# Patient Record
Sex: MALE | Race: WHITE | NOT HISPANIC OR LATINO | Employment: OTHER | URBAN - METROPOLITAN AREA
[De-identification: names, ages, dates, MRNs, and addresses within clinical notes are randomized per-mention and may not be internally consistent; named-entity substitution may affect disease eponyms.]

---

## 2023-08-21 ENCOUNTER — HOSPITAL ENCOUNTER (INPATIENT)
Facility: CLINIC | Age: 73
LOS: 5 days | Discharge: HOME | DRG: 193 | End: 2023-08-26
Attending: EMERGENCY MEDICINE | Admitting: STUDENT IN AN ORGANIZED HEALTH CARE EDUCATION/TRAINING PROGRAM
Payer: MEDICARE

## 2023-08-21 ENCOUNTER — APPOINTMENT (OUTPATIENT)
Dept: GENERAL RADIOLOGY | Facility: CLINIC | Age: 73
DRG: 193 | End: 2023-08-21
Attending: EMERGENCY MEDICINE
Payer: MEDICARE

## 2023-08-21 DIAGNOSIS — J11.00 INFLUENZAL PNEUMONIA: ICD-10-CM

## 2023-08-21 DIAGNOSIS — R09.02 HYPOXIA: ICD-10-CM

## 2023-08-21 LAB
ALBUMIN SERPL BCG-MCNC: 4.3 G/DL (ref 3.5–5.2)
ALP SERPL-CCNC: 46 U/L (ref 40–129)
ALT SERPL W P-5'-P-CCNC: 22 U/L (ref 0–70)
ANION GAP SERPL CALCULATED.3IONS-SCNC: 14 MMOL/L (ref 7–15)
AST SERPL W P-5'-P-CCNC: 41 U/L (ref 0–45)
BASOPHILS # BLD AUTO: 0 10E3/UL (ref 0–0.2)
BASOPHILS NFR BLD AUTO: 0 %
BILIRUB SERPL-MCNC: 0.5 MG/DL
BUN SERPL-MCNC: 14.9 MG/DL (ref 8–23)
CALCIUM SERPL-MCNC: 8.7 MG/DL (ref 8.8–10.2)
CHLORIDE SERPL-SCNC: 99 MMOL/L (ref 98–107)
CREAT SERPL-MCNC: 1.05 MG/DL (ref 0.67–1.17)
CRP SERPL-MCNC: 37.36 MG/L
DEPRECATED HCO3 PLAS-SCNC: 22 MMOL/L (ref 22–29)
EOSINOPHIL # BLD AUTO: 0 10E3/UL (ref 0–0.7)
EOSINOPHIL NFR BLD AUTO: 0 %
ERYTHROCYTE [DISTWIDTH] IN BLOOD BY AUTOMATED COUNT: 14.6 % (ref 10–15)
FLUAV RNA SPEC QL NAA+PROBE: POSITIVE
FLUBV RNA RESP QL NAA+PROBE: NEGATIVE
GFR SERPL CREATININE-BSD FRML MDRD: 74 ML/MIN/1.73M2
GLUCOSE SERPL-MCNC: 118 MG/DL (ref 70–99)
HCO3 BLDV-SCNC: 23 MMOL/L (ref 21–28)
HCT VFR BLD AUTO: 43.1 % (ref 40–53)
HGB BLD-MCNC: 14.3 G/DL (ref 13.3–17.7)
HOLD SPECIMEN: NORMAL
IMM GRANULOCYTES # BLD: 0.1 10E3/UL
IMM GRANULOCYTES NFR BLD: 1 %
LACTATE BLD-SCNC: 1.4 MMOL/L
LYMPHOCYTES # BLD AUTO: 0.9 10E3/UL (ref 0.8–5.3)
LYMPHOCYTES NFR BLD AUTO: 10 %
MCH RBC QN AUTO: 30.7 PG (ref 26.5–33)
MCHC RBC AUTO-ENTMCNC: 33.2 G/DL (ref 31.5–36.5)
MCV RBC AUTO: 93 FL (ref 78–100)
MONOCYTES # BLD AUTO: 0.6 10E3/UL (ref 0–1.3)
MONOCYTES NFR BLD AUTO: 7 %
NEUTROPHILS # BLD AUTO: 7.3 10E3/UL (ref 1.6–8.3)
NEUTROPHILS NFR BLD AUTO: 82 %
NRBC # BLD AUTO: 0 10E3/UL
NRBC BLD AUTO-RTO: 0 /100
PCO2 BLDV: 38 MM HG (ref 40–50)
PH BLDV: 7.39 [PH] (ref 7.32–7.43)
PLATELET # BLD AUTO: 141 10E3/UL (ref 150–450)
PO2 BLDV: 35 MM HG (ref 25–47)
POTASSIUM SERPL-SCNC: 4 MMOL/L (ref 3.4–5.3)
PROCALCITONIN SERPL IA-MCNC: 0.15 NG/ML
PROT SERPL-MCNC: 6.7 G/DL (ref 6.4–8.3)
RBC # BLD AUTO: 4.66 10E6/UL (ref 4.4–5.9)
RSV RNA SPEC NAA+PROBE: NEGATIVE
SAO2 % BLDV: 66 % (ref 94–100)
SARS-COV-2 RNA RESP QL NAA+PROBE: NEGATIVE
SODIUM SERPL-SCNC: 135 MMOL/L (ref 136–145)
WBC # BLD AUTO: 9 10E3/UL (ref 4–11)

## 2023-08-21 PROCEDURE — 258N000003 HC RX IP 258 OP 636: Performed by: EMERGENCY MEDICINE

## 2023-08-21 PROCEDURE — 80053 COMPREHEN METABOLIC PANEL: CPT | Performed by: EMERGENCY MEDICINE

## 2023-08-21 PROCEDURE — 250N000009 HC RX 250: Performed by: EMERGENCY MEDICINE

## 2023-08-21 PROCEDURE — 71046 X-RAY EXAM CHEST 2 VIEWS: CPT

## 2023-08-21 PROCEDURE — 87077 CULTURE AEROBIC IDENTIFY: CPT | Performed by: EMERGENCY MEDICINE

## 2023-08-21 PROCEDURE — 84145 PROCALCITONIN (PCT): CPT | Performed by: EMERGENCY MEDICINE

## 2023-08-21 PROCEDURE — 85025 COMPLETE CBC W/AUTO DIFF WBC: CPT | Performed by: EMERGENCY MEDICINE

## 2023-08-21 PROCEDURE — 87149 DNA/RNA DIRECT PROBE: CPT | Performed by: EMERGENCY MEDICINE

## 2023-08-21 PROCEDURE — 87077 CULTURE AEROBIC IDENTIFY: CPT | Performed by: STUDENT IN AN ORGANIZED HEALTH CARE EDUCATION/TRAINING PROGRAM

## 2023-08-21 PROCEDURE — 87637 SARSCOV2&INF A&B&RSV AMP PRB: CPT | Performed by: EMERGENCY MEDICINE

## 2023-08-21 PROCEDURE — 36415 COLL VENOUS BLD VENIPUNCTURE: CPT | Performed by: STUDENT IN AN ORGANIZED HEALTH CARE EDUCATION/TRAINING PROGRAM

## 2023-08-21 PROCEDURE — 250N000013 HC RX MED GY IP 250 OP 250 PS 637: Performed by: STUDENT IN AN ORGANIZED HEALTH CARE EDUCATION/TRAINING PROGRAM

## 2023-08-21 PROCEDURE — 83605 ASSAY OF LACTIC ACID: CPT

## 2023-08-21 PROCEDURE — 99285 EMERGENCY DEPT VISIT HI MDM: CPT | Mod: 25

## 2023-08-21 PROCEDURE — 120N000001 HC R&B MED SURG/OB

## 2023-08-21 PROCEDURE — 36415 COLL VENOUS BLD VENIPUNCTURE: CPT | Performed by: EMERGENCY MEDICINE

## 2023-08-21 PROCEDURE — 5A09357 ASSISTANCE WITH RESPIRATORY VENTILATION, LESS THAN 24 CONSECUTIVE HOURS, CONTINUOUS POSITIVE AIRWAY PRESSURE: ICD-10-PCS | Performed by: STUDENT IN AN ORGANIZED HEALTH CARE EDUCATION/TRAINING PROGRAM

## 2023-08-21 PROCEDURE — 99222 1ST HOSP IP/OBS MODERATE 55: CPT | Mod: AI | Performed by: STUDENT IN AN ORGANIZED HEALTH CARE EDUCATION/TRAINING PROGRAM

## 2023-08-21 PROCEDURE — 250N000013 HC RX MED GY IP 250 OP 250 PS 637: Performed by: EMERGENCY MEDICINE

## 2023-08-21 PROCEDURE — 258N000003 HC RX IP 258 OP 636: Performed by: STUDENT IN AN ORGANIZED HEALTH CARE EDUCATION/TRAINING PROGRAM

## 2023-08-21 PROCEDURE — 86140 C-REACTIVE PROTEIN: CPT | Performed by: EMERGENCY MEDICINE

## 2023-08-21 RX ORDER — SIMVASTATIN 20 MG
20 TABLET ORAL AT BEDTIME
COMMUNITY

## 2023-08-21 RX ORDER — ACETAMINOPHEN 650 MG/1
650 SUPPOSITORY RECTAL EVERY 6 HOURS PRN
Status: DISCONTINUED | OUTPATIENT
Start: 2023-08-21 | End: 2023-08-26 | Stop reason: HOSPADM

## 2023-08-21 RX ORDER — METOPROLOL SUCCINATE 25 MG/1
25 TABLET, EXTENDED RELEASE ORAL EVERY EVENING
Status: DISCONTINUED | OUTPATIENT
Start: 2023-08-21 | End: 2023-08-26 | Stop reason: HOSPADM

## 2023-08-21 RX ORDER — PANTOPRAZOLE SODIUM 20 MG/1
20 TABLET, DELAYED RELEASE ORAL
Status: DISCONTINUED | OUTPATIENT
Start: 2023-08-22 | End: 2023-08-26 | Stop reason: HOSPADM

## 2023-08-21 RX ORDER — BUPROPION HYDROCHLORIDE 150 MG/1
150 TABLET ORAL EVERY MORNING
Status: DISCONTINUED | OUTPATIENT
Start: 2023-08-22 | End: 2023-08-26 | Stop reason: HOSPADM

## 2023-08-21 RX ORDER — ACETAMINOPHEN 325 MG/1
650 TABLET ORAL EVERY 6 HOURS PRN
Status: DISCONTINUED | OUTPATIENT
Start: 2023-08-21 | End: 2023-08-26 | Stop reason: HOSPADM

## 2023-08-21 RX ORDER — LOPERAMIDE HYDROCHLORIDE 2 MG/1
2 TABLET ORAL 2 TIMES DAILY PRN
COMMUNITY

## 2023-08-21 RX ORDER — GUAIFENESIN 200 MG/10ML
200 LIQUID ORAL EVERY 4 HOURS PRN
Status: DISCONTINUED | OUTPATIENT
Start: 2023-08-21 | End: 2023-08-26 | Stop reason: HOSPADM

## 2023-08-21 RX ORDER — ASPIRIN 81 MG/1
81 TABLET ORAL DAILY
Status: DISCONTINUED | OUTPATIENT
Start: 2023-08-22 | End: 2023-08-26 | Stop reason: HOSPADM

## 2023-08-21 RX ORDER — OSELTAMIVIR PHOSPHATE 75 MG/1
75 CAPSULE ORAL 2 TIMES DAILY
Status: COMPLETED | OUTPATIENT
Start: 2023-08-22 | End: 2023-08-26

## 2023-08-21 RX ORDER — LEVOTHYROXINE SODIUM 175 UG/1
175 TABLET ORAL DAILY
COMMUNITY

## 2023-08-21 RX ORDER — SODIUM CHLORIDE 9 MG/ML
INJECTION, SOLUTION INTRAVENOUS CONTINUOUS
Status: ACTIVE | OUTPATIENT
Start: 2023-08-21 | End: 2023-08-22

## 2023-08-21 RX ORDER — SIMVASTATIN 20 MG
20 TABLET ORAL AT BEDTIME
Status: DISCONTINUED | OUTPATIENT
Start: 2023-08-21 | End: 2023-08-26 | Stop reason: HOSPADM

## 2023-08-21 RX ORDER — ONDANSETRON 4 MG/1
4 TABLET, ORALLY DISINTEGRATING ORAL EVERY 6 HOURS PRN
Status: DISCONTINUED | OUTPATIENT
Start: 2023-08-21 | End: 2023-08-26 | Stop reason: HOSPADM

## 2023-08-21 RX ORDER — OSELTAMIVIR PHOSPHATE 75 MG/1
75 CAPSULE ORAL ONCE
Status: COMPLETED | OUTPATIENT
Start: 2023-08-21 | End: 2023-08-21

## 2023-08-21 RX ORDER — BUPROPION HYDROCHLORIDE 150 MG/1
150 TABLET ORAL EVERY MORNING
COMMUNITY

## 2023-08-21 RX ORDER — FLUOXETINE 40 MG/1
40 CAPSULE ORAL
COMMUNITY

## 2023-08-21 RX ORDER — ACETAMINOPHEN 500 MG
1000 TABLET ORAL ONCE
Status: COMPLETED | OUTPATIENT
Start: 2023-08-21 | End: 2023-08-21

## 2023-08-21 RX ORDER — ASPIRIN 81 MG/1
81 TABLET ORAL
COMMUNITY

## 2023-08-21 RX ORDER — ONDANSETRON 2 MG/ML
4 INJECTION INTRAMUSCULAR; INTRAVENOUS EVERY 6 HOURS PRN
Status: DISCONTINUED | OUTPATIENT
Start: 2023-08-21 | End: 2023-08-26 | Stop reason: HOSPADM

## 2023-08-21 RX ORDER — METOPROLOL SUCCINATE 25 MG/1
25 TABLET, EXTENDED RELEASE ORAL EVERY EVENING
COMMUNITY

## 2023-08-21 RX ORDER — LANOLIN ALCOHOL/MO/W.PET/CERES
1000 CREAM (GRAM) TOPICAL DAILY
COMMUNITY

## 2023-08-21 RX ORDER — IBUPROFEN 400 MG/1
400 TABLET, FILM COATED ORAL ONCE
Status: COMPLETED | OUTPATIENT
Start: 2023-08-21 | End: 2023-08-21

## 2023-08-21 RX ORDER — LEVOTHYROXINE SODIUM 175 UG/1
175 TABLET ORAL
Status: DISCONTINUED | OUTPATIENT
Start: 2023-08-22 | End: 2023-08-26 | Stop reason: HOSPADM

## 2023-08-21 RX ORDER — IPRATROPIUM BROMIDE AND ALBUTEROL SULFATE 2.5; .5 MG/3ML; MG/3ML
3 SOLUTION RESPIRATORY (INHALATION) ONCE
Status: COMPLETED | OUTPATIENT
Start: 2023-08-21 | End: 2023-08-21

## 2023-08-21 RX ORDER — LIDOCAINE 40 MG/G
CREAM TOPICAL
Status: DISCONTINUED | OUTPATIENT
Start: 2023-08-21 | End: 2023-08-26 | Stop reason: HOSPADM

## 2023-08-21 RX ADMIN — IPRATROPIUM BROMIDE AND ALBUTEROL SULFATE 3 ML: .5; 3 SOLUTION RESPIRATORY (INHALATION) at 18:33

## 2023-08-21 RX ADMIN — SODIUM CHLORIDE 1000 ML: 9 INJECTION, SOLUTION INTRAVENOUS at 16:52

## 2023-08-21 RX ADMIN — ACETAMINOPHEN 1000 MG: 500 TABLET, FILM COATED ORAL at 16:50

## 2023-08-21 RX ADMIN — IBUPROFEN 400 MG: 400 TABLET ORAL at 16:50

## 2023-08-21 RX ADMIN — OSELTAMIVIR PHOSPHATE 75 MG: 75 CAPSULE ORAL at 20:24

## 2023-08-21 RX ADMIN — SIMVASTATIN 20 MG: 20 TABLET, FILM COATED ORAL at 22:43

## 2023-08-21 RX ADMIN — SODIUM CHLORIDE: 9 INJECTION, SOLUTION INTRAVENOUS at 21:55

## 2023-08-21 RX ADMIN — METOPROLOL SUCCINATE 25 MG: 25 TABLET, EXTENDED RELEASE ORAL at 21:56

## 2023-08-21 RX ADMIN — APIXABAN 5 MG: 5 TABLET, FILM COATED ORAL at 21:56

## 2023-08-21 ASSESSMENT — ACTIVITIES OF DAILY LIVING (ADL)
ADLS_ACUITY_SCORE: 35
DIFFICULTY_EATING/SWALLOWING: NO
WALKING_OR_CLIMBING_STAIRS_DIFFICULTY: NO
CONCENTRATING,_REMEMBERING_OR_MAKING_DECISIONS_DIFFICULTY: NO
ADLS_ACUITY_SCORE: 35
WEAR_GLASSES_OR_BLIND: YES
ADLS_ACUITY_SCORE: 35
CHANGE_IN_FUNCTIONAL_STATUS_SINCE_ONSET_OF_CURRENT_ILLNESS/INJURY: YES
FALL_HISTORY_WITHIN_LAST_SIX_MONTHS: NO
DOING_ERRANDS_INDEPENDENTLY_DIFFICULTY: NO
TOILETING_ISSUES: NO
DRESSING/BATHING_DIFFICULTY: NO
ADLS_ACUITY_SCORE: 35

## 2023-08-21 NOTE — ED PROVIDER NOTES
"  History     Chief Complaint:  Altered Mental Status       HPI   Los Guerrero is a 73 year old male who presents with a 2-day history of acute onset of fever, and significant cough.  Things really intensified yesterday.  He just traveled to [place] and so had plenty of exposure opportunity both at the park and on the airplane in both directions.  The patient notes that his COVID vaccinations are up-to-date.  He has not yet received the seasonal influenza vaccine as it is not widely available in the market.  Patient has no history of COPD, emphysema, or asthma.  Patient has had some mild body aches.      Independent Historian:   Patient's wife provides some supplemental history.  She notes that she has not specifically been ill.    Review of External Notes:  None    Allergies:  Penicillins     Medications:    apixaban ANTICOAGULANT (ELIQUIS) 5 MG tablet  aspirin 81 MG EC tablet  buPROPion (WELLBUTRIN XL) 150 MG 24 hr tablet  cyanocobalamin (VITAMIN B-12) 1000 MCG tablet  FLUoxetine (PROZAC) 40 MG capsule  levothyroxine (SYNTHROID/LEVOTHROID) 175 MCG tablet  loperamide (IMODIUM A-D) 2 MG tablet  metoprolol succinate ER (TOPROL XL) 25 MG 24 hr tablet  Omeprazole Magnesium (PRILOSEC PO)  simvastatin (ZOCOR) 20 MG tablet  VITAMIN D PO        Past Medical History:    Paroxysmal atrial fibrillation  Hypothyroidism  Possible TIA    Past Surgical History:       Patient has had shoulder replacement and bilateral knee replacements.  Family History:    family history is not on file.    Social History:     PCP: No Ref-Primary, Physician     Physical Exam   Patient Vitals for the past 24 hrs:   BP Temp Temp src Pulse Resp SpO2 Height Weight   08/21/23 1800 -- -- -- 92 19 91 % -- --   08/21/23 1526 (!) 140/88 (!) 102.8  F (39.3  C) Oral 96 16 90 % 1.676 m (5' 6\") 83.5 kg (184 lb)   08/21/23 1525 (!) 140/88 (!) 102.8  F (39.3  C) Oral 96 18 91 % 1.676 m (5' 6\") 81.6 kg (180 lb)   Temperature was rechecked at 1824 hrs. and " was afebrile at 98        General: Resting uncomfortably on the gurney, the patient has a very frequent paroxysmal cough which is largely dry.  Head:  The scalp, face, and head appear normal  Eyes:  The pupils are equal, round, and reactive to light    There is no nystagmus    Extraocular muscles are intact    Conjunctivae and sclerae are normal  ENT:    The nose is normal    Pinnae are normal    The oropharynx is normal  Neck:  Normal range of motion    There is no rigidity noted  CV:  Regular rate  Normal underlying rhythm     Normal S1/S2    No pathological murmur detected  Resp:  Lungs are clear    There is no tachypnea    Non-labored    No rales    No wheezing   GI:  Abdomen is soft, there is no rigidity    No distension/tympani    No rebound tenderness     Non-surgical without peritoneal features at this time  MS:  Normal muscular tone    Symmetric motor strength    No major joint effusions    No asymmetric leg swelling, no calf tenderness  Skin:  No rash or acute skin lesions noted  Neuro:  Speech is normal and fluent, there is no aphasia    No motor deficits    Cranial nerves are intact  Psych: Awake. Alert.      Normal affect.  Appropriate interactions.      Emergency Department Course   No results found for this or any previous visit.     Imaging:  XR Chest 2 Views   Final Result   IMPRESSION: Left-sided portacatheter with tip in the SVC. Normal heart size. Coronary arterial stents. No pulmonary infiltrates. Mild hyperinflation.         Report per radiology    Laboratory:  Labs Ordered and Resulted from Time of ED Arrival to Time of ED Departure   INFLUENZA A/B, RSV, & SARS-COV2 PCR - Abnormal       Result Value    Influenza A PCR Positive (*)     Influenza B PCR Negative      RSV PCR Negative      SARS CoV2 PCR Negative     COMPREHENSIVE METABOLIC PANEL - Abnormal    Sodium 135 (*)     Potassium 4.0      Chloride 99      Carbon Dioxide (CO2) 22      Anion Gap 14      Urea Nitrogen 14.9      Creatinine  1.05      Calcium 8.7 (*)     Glucose 118 (*)     Alkaline Phosphatase 46      AST 41      ALT 22      Protein Total 6.7      Albumin 4.3      Bilirubin Total 0.5      GFR Estimate 74     ISTAT GASES LACTATE VENOUS POCT - Abnormal    Lactic Acid POCT 1.4      Bicarbonate Venous POCT 23      O2 Sat, Venous POCT 66 (*)     pCO2 Venous POCT 38 (*)     pH Venous POCT 7.39      pO2 Venous POCT 35     CBC WITH PLATELETS AND DIFFERENTIAL - Abnormal    WBC Count 9.0      RBC Count 4.66      Hemoglobin 14.3      Hematocrit 43.1      MCV 93      MCH 30.7      MCHC 33.2      RDW 14.6      Platelet Count 141 (*)     % Neutrophils 82      % Lymphocytes 10      % Monocytes 7      % Eosinophils 0      % Basophils 0      % Immature Granulocytes 1      NRBCs per 100 WBC 0      Absolute Neutrophils 7.3      Absolute Lymphocytes 0.9      Absolute Monocytes 0.6      Absolute Eosinophils 0.0      Absolute Basophils 0.0      Absolute Immature Granulocytes 0.1      Absolute NRBCs 0.0     PROCALCITONIN - Abnormal    Procalcitonin 0.15 (*)    CRP INFLAMMATION - Abnormal    CRP Inflammation 37.36 (*)    BLOOD CULTURE   BLOOD CULTURE        Procedures       Emergency Department Course & Assessments:             Interventions:  Medications   oseltamivir (TAMIFLU) capsule 75 mg (has no administration in time range)   ipratropium - albuterol 0.5 mg/2.5 mg/3 mL (DUONEB) neb solution 3 mL (has no administration in time range)   acetaminophen (TYLENOL) tablet 1,000 mg (1,000 mg Oral $Given 8/21/23 1650)   ibuprofen (ADVIL/MOTRIN) tablet 400 mg (400 mg Oral $Given 8/21/23 1650)   0.9% sodium chloride BOLUS (1,000 mLs Intravenous $New Bag 8/21/23 1652)          Independent Interpretation:  Looked at the patient's chest x-ray this does show some increased interstitial markings consistent with a viral type pneumonitis.  This is consistent with influenza pneumonia    Assessments/Consultations/Discussion of Management:     Was seen on arrival  6:25  "PM  Patient was reassessed at this time to go over results and and note the admission for oxygen therapy.    6:31 PM  Patient is admitted to Dr. Ashanti Joshi for further management    Disposition:  Admission to the hospital for further management    Impression & Plan        Medical Decision Making:  This patient presents to the emergency department with an acute febrile illness.  He presents with fever cough and hypoxia.  The patient is on Eliquis at baseline for paroxysmal atrial fibrillation diagnosed earlier this year.  He has no history of DVT or PE.  Patient underwent extensive evaluation in the emergency department and was noted to have acute influenza.  He was COVID-negative and RSV negative.  His white count was normal which is consistent with a viral process.  His chest x-ray shows increased interstitial markings consistent with a viral type pneumonitis.  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  Patient will require admission to the hospital for oxygen therapy.  Tamiflu will be initiated.  I will also try a DuoNeb as the patient had some very subtle wheezing on examination.  There is a broad differential diagnosis to hypoxia which could include bacterial pneumonia, atypical pneumonia, viral pneumonia, pneumothorax, occult pulmonary embolism.  There is no indication that the patient has lobar infiltrate, and given that the patient is on chronic anticoagulation we will not pursue a diagnosis of PE especially in light of the patient's of fever and cough.  The patient will be placed on inpatient status at this time is as it is unlikely the patient will be able to be discharged tomorrow with this presentation.      Diagnosis:     Influenza pneumonia  Hypoxia  History of atrial fibrillation on anticoagulation      Gurvinder Carbajal MD  8/21/2023   Gurvinder Carbajal MD Rock, " Gurvinder MAURER MD  08/21/23 1612

## 2023-08-21 NOTE — H&P
[facility]    History and Physical - Hospitalist Service       Date of Admission:  8/21/2023    Assessment & Plan      Los Guerrero is a 73 year old male admitted on 8/21/2023 for SOB and noted to be in flu positive.    Influenza A  Acute hypoxic respiratory failure  Patient arrived from the airport after several day trip to [place] with his wife. He reports about 3-4 days ago while on his trip he started feeling fatigued with fevers and sweats. He also developed a cough. Within the ED patient febrile to 102.8. hypoxic to 88% on RA. Labs revealed +influenza A with negative covid. Lactate 1.4 with procal of 0.15. CXR without discrete opacities.    - no signs of bacterial pneumonia with mildly elevated procal and normal WBC count  - start tamiflu x5 days  - cough drops, duonebs as needed  - will give IVF x 10 hours as patient reports little PO intake   - obtain 1 more blood culture as only 1 set obtained in the ED, trend cultures    pAfib  CAD s/p PCI  - resume home, metoprolol, eliquis, atorvastatin and asa    Lymphoma in remission  Last treatment 7 years ago. He still has a port in place for preference  - on surveillance    KENYA  -BIPAP per home settings    Incidental CVA  Noted on MRI imaging obtained as outpatient work up for dizziness 05/2023  - outpatient work up with PCP ongoing  - continue eliquis, asa and statin    Depression  Suspected mild cognitive decline  - Resume home wellbutrin. Patient tapering off prozac, will just hold for now  - wife notes ongoing outpatient work up for some memory problems and patient with some forgetfulness noted during interview    Hypothyroidism  - Resume home levothyroxine    KENYA  - Home CPAP             Diet:  regular  DVT Prophylaxis: DOAC  Springer Catheter: Not present  Lines: None     Cardiac Monitoring: None  Code Status:  FULL    Clinically Significant Risk Factors Present on Admission               # Drug Induced Coagulation Defect: home  "medication list includes an anticoagulant medication  # Drug Induced Platelet Defect: home medication list includes an antiplatelet medication        # Overweight: Estimated body mass index is 29.7 kg/m  as calculated from the following:    Height as of this encounter: 1.676 m (5' 6\").    Weight as of this encounter: 83.5 kg (184 lb).              Disposition Plan           Sherry Randhawa DO  Hospitalist Service  [facility]  Securely message with [employer] (more info)  Text page via [email or web address] Paging/Directory     ______________________________________________________________________    Chief Complaint   SOB, fatigue    History is obtained from the patient    History of Present Illness   Los Guerrero is a 73 year old male who arrived from the airport after several day trip to [place] with his wife. He reports about 3-4 days ago while on his trip he started feeling fatigued with fevers and sweats. On my assessment patient is fatigued and falls asleep during the interview. He reports not feeling well for a few days and it has progressively worsened. He has been coughing a lot but not getting anything up. He is not eating or drinking much. He has sweats and chills. He denies chest pain. NO GI complaints. He is a bit forgetful during the interview and wife supplements and tells me this has been ongoing issue at home      Past Medical History    No past medical history on file.    Past Surgical History   No past surgical history on file.    Prior to Admission Medications   Prior to Admission Medications   Prescriptions Last Dose Informant Patient Reported? Taking?   FLUoxetine (PROZAC) 40 MG capsule  Spouse/Significant Other Yes Yes   Sig: Take 40 mg by mouth Approximately every other day, \"titrating off\"   Omeprazole Magnesium (PRILOSEC PO) 8/21/2023 Spouse/Significant Other Yes Yes   Sig: Take by mouth daily Dose unknown, OTC   VITAMIN D PO  Spouse/Significant Other Yes Yes   Sig: Take by " mouth daily Dose unknown, possibly 5000 units   apixaban ANTICOAGULANT (ELIQUIS) 5 MG tablet 8/21/2023 at AM Spouse/Significant Other Yes Yes   Sig: Take 5 mg by mouth 2 times daily   aspirin 81 MG EC tablet  Spouse/Significant Other Yes Yes   Sig: Take 81 mg by mouth Approximately every other day per spouse   buPROPion (WELLBUTRIN XL) 150 MG 24 hr tablet 8/21/2023 Spouse/Significant Other Yes Yes   Sig: Take 150 mg by mouth every morning   cyanocobalamin (VITAMIN B-12) 1000 MCG tablet 8/21/2023 Spouse/Significant Other Yes Yes   Sig: Take 1,000 mcg by mouth daily   levothyroxine (SYNTHROID/LEVOTHROID) 175 MCG tablet 8/21/2023 Spouse/Significant Other Yes Yes   Sig: Take 175 mcg by mouth daily   loperamide (IMODIUM A-D) 2 MG tablet  Spouse/Significant Other Yes Yes   Sig: Take 2 mg by mouth 2 times daily as needed for diarrhea   metoprolol succinate ER (TOPROL XL) 25 MG 24 hr tablet 8/20/2023 Spouse/Significant Other Yes Yes   Sig: Take 25 mg by mouth every evening   simvastatin (ZOCOR) 20 MG tablet 8/20/2023 Spouse/Significant Other Yes Yes   Sig: Take 20 mg by mouth At Bedtime      Facility-Administered Medications: None        Review of Systems    The 10 point Review of Systems is negative other than noted in the HPI or here.      Physical Exam   Vital Signs: Temp: 98.9  F (37.2  C) Temp src: Oral BP: 129/76 Pulse: 74   Resp: 19 SpO2: 95 % O2 Device: Nasal cannula Oxygen Delivery: 3 LPM  Weight: 184 lbs 0 oz    Constitutional: Awake, alert, cooperative,  Eyes: Conjunctiva and pupils examined and normal.  HEENT: dry mucus membranes noted  Respiratory: course breath sounds throughout  Cardiovascular:irregular rate and rhythm, and no murmur noted.  GI: Soft, non-distended, non-tender, normal bowel sounds.  Skin: No rashes, no cyanosis, no edema.  Musculoskeletal: deviation of joints in the toes likely chronic  Psychiatric: Alert, oriented to person, place and time,    Medical Decision Making       60 MINUTES SPENT  BY ME on the date of service doing chart review, history, exam, documentation & further activities per the note.      Data     I have personally reviewed the following data over the past 24 hrs:    9.0  \   14.3   / 141 (L)     135 (L) 99 14.9 /  118 (H)   4.0 22 1.05 \     ALT: 22 AST: 41 AP: 46 TBILI: 0.5   ALB: 4.3 TOT PROTEIN: 6.7 LIPASE: N/A     Procal: 0.15 (H) CRP: 37.36 (H) Lactic Acid: 1.4         Imaging results reviewed over the past 24 hrs:   Recent Results (from the past 24 hour(s))   XR Chest 2 Views    Narrative    EXAM: XR CHEST 2 VIEWS  LOCATION: [facility]  DATE: 8/21/2023    INDICATION: Fever, cough, pneumonia  COMPARISON: None.      Impression    IMPRESSION: Left-sided portacatheter with tip in the SVC. Normal heart size. Coronary arterial stents. No pulmonary infiltrates. Mild hyperinflation.

## 2023-08-21 NOTE — PHARMACY-ADMISSION MEDICATION HISTORY
"Pharmacist Admission Medication History    Admission medication history is complete. The information provided in this note is only as accurate as the sources available at the time of the update.    Medication reconciliation/reorder completed by provider prior to medication history? No    Information Source(s): Family member, Patient's pharmacy, and pillbox  via in-person and phone    Pertinent Information:   -per spouse, patient is \"titrating off\" fluoxetine. Currently taking approximately every other day and thinks the plan is to stop the medication ~next week.   -per spouse, she puts a baby aspirin in pillbox approximately every other day as she was not sure if patient was supposed to still be taking this with his Eliquis.  -Solifenacin 5 mg daily filled 5/22/23 for 90-day supply. Spouse reports this did not work and patient is no longer taking.    Changes made to PTA medication list:  Added: all medications  Deleted: None  Changed: None    Medication Affordability:  Not including over the counter (OTC) medications, was there a time in the past 3 months when you did not take your medications as prescribed because of cost?: No    Allergies reviewed with patient and updates made in EHR: yes, spouse reports an allergy to unknown medication patient received as a \"spinal block\" for a knee surgery many years ago. Spouse reports patient \"flatlined.\" Unable to add as unclear what medication this was.    Medication History Completed By: Miranda Matt RPH 8/21/2023 5:41 PM    Prior to Admission medications    Medication Sig Last Dose Taking? Auth Provider Long Term End Date   apixaban ANTICOAGULANT (ELIQUIS) 5 MG tablet Take 5 mg by mouth 2 times daily 8/21/2023 at AM Yes Unknown, Entered By History     aspirin 81 MG EC tablet Take 81 mg by mouth Approximately every other day per spouse  Yes Unknown, Entered By History     buPROPion (WELLBUTRIN XL) 150 MG 24 hr tablet Take 150 mg by mouth every morning 8/21/2023 Yes " "Unknown, Entered By History Yes    cyanocobalamin (VITAMIN B-12) 1000 MCG tablet Take 1,000 mcg by mouth daily 8/21/2023 Yes Unknown, Entered By History     FLUoxetine (PROZAC) 40 MG capsule Take 40 mg by mouth Approximately every other day, \"titrating off\"  Yes Unknown, Entered By History Yes    levothyroxine (SYNTHROID/LEVOTHROID) 175 MCG tablet Take 175 mcg by mouth daily 8/21/2023 Yes Unknown, Entered By History Yes    loperamide (IMODIUM A-D) 2 MG tablet Take 2 mg by mouth 2 times daily as needed for diarrhea  Yes Unknown, Entered By History     metoprolol succinate ER (TOPROL XL) 25 MG 24 hr tablet Take 25 mg by mouth every evening 8/20/2023 Yes Unknown, Entered By History Yes    Omeprazole Magnesium (PRILOSEC PO) Take by mouth daily Dose unknown, OTC 8/21/2023 Yes Unknown, Entered By History     simvastatin (ZOCOR) 20 MG tablet Take 20 mg by mouth At Bedtime 8/20/2023 Yes Unknown, Entered By History Yes    VITAMIN D PO Take by mouth daily Dose unknown, possibly 5000 units  Yes Unknown, Entered By History         "

## 2023-08-21 NOTE — ED NOTES
[facility]  ED Nurse Handoff Report    ED Chief complaint: Altered Mental Status      ED Diagnosis:   Final diagnoses:   None       Code Status: Full Code    Allergies:   Allergies   Allergen Reactions    Penicillins Shortness Of Breath       Patient Story:   Brought to ED via EMS from airport. Wife noted him to have altered metal status ad a cough ad fever    Focused Assessment:    Neuro: Alert, oriented x 3  Respiratory:Congested cough but non-productive while here. Positiv\e for influenza-A   Cardiology:  mild HTN   Gastrointestinal: soft, non tender, non distended   Genitourinary/Renal:  Was incontinent of stool and urine PTA. Has depends on currently  Musculoskeletal: moves all extremities   Skin: Intact skin   Lines: has a port  in the left upper chest that was accessed in the ED. Dressing new, CDI withbio-patch in place    Labs Ordered and Resulted from Time of ED Arrival to Time of ED Departure   INFLUENZA A/B, RSV, & SARS-COV2 PCR - Abnormal       Result Value    Influenza A PCR Positive (*)     Influenza B PCR Negative      RSV PCR Negative      SARS CoV2 PCR Negative     COMPREHENSIVE METABOLIC PANEL - Abnormal    Sodium 135 (*)     Potassium 4.0      Chloride 99      Carbon Dioxide (CO2) 22      Anion Gap 14      Urea Nitrogen 14.9      Creatinine 1.05      Calcium 8.7 (*)     Glucose 118 (*)     Alkaline Phosphatase 46      AST 41      ALT 22      Protein Total 6.7      Albumin 4.3      Bilirubin Total 0.5      GFR Estimate 74     ISTAT GASES LACTATE VENOUS POCT - Abnormal    Lactic Acid POCT 1.4      Bicarbonate Venous POCT 23      O2 Sat, Venous POCT 66 (*)     pCO2 Venous POCT 38 (*)     pH Venous POCT 7.39      pO2 Venous POCT 35     CBC WITH PLATELETS AND DIFFERENTIAL - Abnormal    WBC Count 9.0      RBC Count 4.66      Hemoglobin 14.3      Hematocrit 43.1      MCV 93      MCH 30.7      MCHC 33.2      RDW 14.6      Platelet Count 141 (*)     % Neutrophils 82      % Lymphocytes 10       % Monocytes 7      % Eosinophils 0      % Basophils 0      % Immature Granulocytes 1      NRBCs per 100 WBC 0      Absolute Neutrophils 7.3      Absolute Lymphocytes 0.9      Absolute Monocytes 0.6      Absolute Eosinophils 0.0      Absolute Basophils 0.0      Absolute Immature Granulocytes 0.1      Absolute NRBCs 0.0     PROCALCITONIN - Abnormal    Procalcitonin 0.15 (*)    CRP INFLAMMATION - Abnormal    CRP Inflammation 37.36 (*)    BLOOD CULTURE   BLOOD CULTURE        XR Chest 2 Views   Final Result   IMPRESSION: Left-sided portacatheter with tip in the SVC. Normal heart size. Coronary arterial stents. No pulmonary infiltrates. Mild hyperinflation.            Treatments and/or interventions provided:      Medications   oseltamivir (TAMIFLU) capsule 75 mg (has no administration in time range)   ipratropium - albuterol 0.5 mg/2.5 mg/3 mL (DUONEB) neb solution 3 mL (has no administration in time range)   acetaminophen (TYLENOL) tablet 1,000 mg (1,000 mg Oral $Given 8/21/23 1650)   ibuprofen (ADVIL/MOTRIN) tablet 400 mg (400 mg Oral $Given 8/21/23 1650)   0.9% sodium chloride BOLUS (0 mLs Intravenous Stopped 8/21/23 1722)        Patient's response to treatments and/or interventions:   Resting comfortably    To be done/followed up on inpatient unit:    See any in-patient orders    Does this patient have any cognitive concerns?: Disoriented to situation    Activity level - Baseline/Home:    Independent    Activity Level - Current:     Stand with assist x2    Patient's Preferred language: English     Needed?: No    Isolation: Droplet  Infection: Influenza  Patient tested for COVID 19 prior to admission: YES    Bariatric?: No    Vital Signs:   Vitals:    08/21/23 1525 08/21/23 1526 08/21/23 1800 08/21/23 1824   BP: (!) 140/88 (!) 140/88  129/76   Pulse: 96 96 92 74   Resp: 18 16 19    Temp: (!) 102.8  F (39.3  C) (!) 102.8  F (39.3  C)  98.9  F (37.2  C)   TempSrc: Oral Oral  Oral   SpO2: 91% 90% 91% 95%  "  Weight: 81.6 kg (180 lb) 83.5 kg (184 lb)     Height: 1.676 m (5' 6\") 1.676 m (5' 6\")         Cardiac Rhythm:     Was the PSS-3 completed:   Yes  What interventions are required if any?               Family Comments: spouse at bedside  OBS brochure/video discussed/provided to patient/family: Yes              Name of person given brochure if not patient:              Relationship to patient:    For the majority of the shift this patient's behavior was Green.   Behavioral interventions performed were     ED NURSE PHONE NUMBER: *[phone]          "

## 2023-08-21 NOTE — ED TRIAGE NOTES
BIBA from airport. ON his way home to [facility] from [place]. Wife notes AMS starting yesterday. Cough and fever and AMS.      Triage Assessment       Row Name 08/21/23 8626       Triage Assessment (Adult)    Airway WDL WDL       Respiratory WDL    Respiratory WDL X  cough       Skin Circulation/Temperature WDL    Skin Circulation/Temperature WDL X  warm       Cardiac WDL    Cardiac WDL WDL       Peripheral/Neurovascular WDL    Peripheral Neurovascular WDL WDL       Cognitive/Neuro/Behavioral WDL    Cognitive/Neuro/Behavioral WDL WDL

## 2023-08-21 NOTE — ED NOTES
Bed: ED08  Expected date:   Expected time:   Means of arrival:   Comments:  Manan 523 73 M ALOC ETA 1522

## 2023-08-22 LAB
ADV 40+41 DNA STL QL NAA+NON-PROBE: NEGATIVE
ALBUMIN UR-MCNC: 50 MG/DL
ANION GAP SERPL CALCULATED.3IONS-SCNC: 12 MMOL/L (ref 7–15)
APPEARANCE UR: CLEAR
ASTRO TYP 1-8 RNA STL QL NAA+NON-PROBE: NEGATIVE
BASOPHILS # BLD AUTO: 0 10E3/UL (ref 0–0.2)
BASOPHILS NFR BLD AUTO: 1 %
BILIRUB UR QL STRIP: NEGATIVE
BUN SERPL-MCNC: 14.8 MG/DL (ref 8–23)
C CAYETANENSIS DNA STL QL NAA+NON-PROBE: NEGATIVE
C DIFF TOX B STL QL: NEGATIVE
CALCIUM SERPL-MCNC: 8.4 MG/DL (ref 8.8–10.2)
CAMPYLOBACTER DNA SPEC NAA+PROBE: NEGATIVE
CHLORIDE SERPL-SCNC: 103 MMOL/L (ref 98–107)
COLOR UR AUTO: ABNORMAL
CREAT SERPL-MCNC: 0.98 MG/DL (ref 0.67–1.17)
CRYPTOSP DNA STL QL NAA+NON-PROBE: NEGATIVE
DEPRECATED HCO3 PLAS-SCNC: 22 MMOL/L (ref 22–29)
E COLI O157 DNA STL QL NAA+NON-PROBE: ABNORMAL
E HISTOLYT DNA STL QL NAA+NON-PROBE: NEGATIVE
EAEC ASTA GENE ISLT QL NAA+PROBE: NEGATIVE
EC STX1+STX2 GENES STL QL NAA+NON-PROBE: NEGATIVE
ENTEROCOCCUS FAECALIS: NOT DETECTED
ENTEROCOCCUS FAECIUM: NOT DETECTED
EOSINOPHIL # BLD AUTO: 0 10E3/UL (ref 0–0.7)
EOSINOPHIL NFR BLD AUTO: 0 %
EPEC EAE GENE STL QL NAA+NON-PROBE: POSITIVE
ERYTHROCYTE [DISTWIDTH] IN BLOOD BY AUTOMATED COUNT: 14.6 % (ref 10–15)
ETEC LTA+ST1A+ST1B TOX ST NAA+NON-PROBE: NEGATIVE
G LAMBLIA DNA STL QL NAA+NON-PROBE: NEGATIVE
GFR SERPL CREATININE-BSD FRML MDRD: 81 ML/MIN/1.73M2
GLUCOSE SERPL-MCNC: 113 MG/DL (ref 70–99)
GLUCOSE UR STRIP-MCNC: NEGATIVE MG/DL
HCT VFR BLD AUTO: 41.4 % (ref 40–53)
HGB BLD-MCNC: 13.4 G/DL (ref 13.3–17.7)
HGB UR QL STRIP: ABNORMAL
IMM GRANULOCYTES # BLD: 0 10E3/UL
IMM GRANULOCYTES NFR BLD: 1 %
KETONES UR STRIP-MCNC: NEGATIVE MG/DL
LEUKOCYTE ESTERASE UR QL STRIP: NEGATIVE
LISTERIA SPECIES (DETECTED/NOT DETECTED): NOT DETECTED
LYMPHOCYTES # BLD AUTO: 0.8 10E3/UL (ref 0.8–5.3)
LYMPHOCYTES NFR BLD AUTO: 14 %
MCH RBC QN AUTO: 30.2 PG (ref 26.5–33)
MCHC RBC AUTO-ENTMCNC: 32.4 G/DL (ref 31.5–36.5)
MCV RBC AUTO: 94 FL (ref 78–100)
MONOCYTES # BLD AUTO: 0.5 10E3/UL (ref 0–1.3)
MONOCYTES NFR BLD AUTO: 8 %
MUCOUS THREADS #/AREA URNS LPF: PRESENT /LPF
NEUTROPHILS # BLD AUTO: 4.7 10E3/UL (ref 1.6–8.3)
NEUTROPHILS NFR BLD AUTO: 76 %
NITRATE UR QL: NEGATIVE
NOROVIRUS GI+II RNA STL QL NAA+NON-PROBE: NEGATIVE
NRBC # BLD AUTO: 0 10E3/UL
NRBC BLD AUTO-RTO: 0 /100
P SHIGELLOIDES DNA STL QL NAA+NON-PROBE: NEGATIVE
PH UR STRIP: 5 [PH] (ref 5–7)
PLATELET # BLD AUTO: 113 10E3/UL (ref 150–450)
POTASSIUM SERPL-SCNC: 4.4 MMOL/L (ref 3.4–5.3)
RBC # BLD AUTO: 4.43 10E6/UL (ref 4.4–5.9)
RBC URINE: 1 /HPF
RVA RNA STL QL NAA+NON-PROBE: NEGATIVE
SALMONELLA SP RPOD STL QL NAA+PROBE: NEGATIVE
SAPO I+II+IV+V RNA STL QL NAA+NON-PROBE: NEGATIVE
SHIGELLA SP+EIEC IPAH ST NAA+NON-PROBE: NEGATIVE
SODIUM SERPL-SCNC: 137 MMOL/L (ref 136–145)
SP GR UR STRIP: 1.02 (ref 1–1.03)
SQUAMOUS EPITHELIAL: <1 /HPF
STAPHYLOCOCCUS AUREUS: NOT DETECTED
STAPHYLOCOCCUS EPIDERMIDIS: DETECTED
STAPHYLOCOCCUS LUGDUNENSIS: NOT DETECTED
STREPTOCOCCUS AGALACTIAE: NOT DETECTED
STREPTOCOCCUS ANGINOSUS GROUP: NOT DETECTED
STREPTOCOCCUS PNEUMONIAE: NOT DETECTED
STREPTOCOCCUS PYOGENES: NOT DETECTED
STREPTOCOCCUS SPECIES: NOT DETECTED
UROBILINOGEN UR STRIP-MCNC: NORMAL MG/DL
V CHOLERAE DNA SPEC QL NAA+PROBE: NEGATIVE
VIBRIO DNA SPEC NAA+PROBE: NEGATIVE
WBC # BLD AUTO: 6.1 10E3/UL (ref 4–11)
WBC URINE: <1 /HPF
Y ENTEROCOL DNA STL QL NAA+PROBE: NEGATIVE

## 2023-08-22 PROCEDURE — 250N000013 HC RX MED GY IP 250 OP 250 PS 637: Performed by: STUDENT IN AN ORGANIZED HEALTH CARE EDUCATION/TRAINING PROGRAM

## 2023-08-22 PROCEDURE — 87040 BLOOD CULTURE FOR BACTERIA: CPT | Performed by: INTERNAL MEDICINE

## 2023-08-22 PROCEDURE — 85025 COMPLETE CBC W/AUTO DIFF WBC: CPT | Performed by: STUDENT IN AN ORGANIZED HEALTH CARE EDUCATION/TRAINING PROGRAM

## 2023-08-22 PROCEDURE — 94640 AIRWAY INHALATION TREATMENT: CPT

## 2023-08-22 PROCEDURE — 94640 AIRWAY INHALATION TREATMENT: CPT | Mod: 76

## 2023-08-22 PROCEDURE — 250N000011 HC RX IP 250 OP 636: Performed by: STUDENT IN AN ORGANIZED HEALTH CARE EDUCATION/TRAINING PROGRAM

## 2023-08-22 PROCEDURE — 99233 SBSQ HOSP IP/OBS HIGH 50: CPT | Performed by: INTERNAL MEDICINE

## 2023-08-22 PROCEDURE — 250N000011 HC RX IP 250 OP 636: Performed by: HOSPITALIST

## 2023-08-22 PROCEDURE — 87493 C DIFF AMPLIFIED PROBE: CPT | Performed by: INTERNAL MEDICINE

## 2023-08-22 PROCEDURE — 36415 COLL VENOUS BLD VENIPUNCTURE: CPT | Performed by: INTERNAL MEDICINE

## 2023-08-22 PROCEDURE — 80048 BASIC METABOLIC PNL TOTAL CA: CPT | Performed by: STUDENT IN AN ORGANIZED HEALTH CARE EDUCATION/TRAINING PROGRAM

## 2023-08-22 PROCEDURE — 120N000001 HC R&B MED SURG/OB

## 2023-08-22 PROCEDURE — 999N000157 HC STATISTIC RCP TIME EA 10 MIN

## 2023-08-22 PROCEDURE — 87507 IADNA-DNA/RNA PROBE TQ 12-25: CPT | Performed by: INTERNAL MEDICINE

## 2023-08-22 PROCEDURE — 81001 URINALYSIS AUTO W/SCOPE: CPT | Performed by: INTERNAL MEDICINE

## 2023-08-22 PROCEDURE — 250N000009 HC RX 250: Performed by: INTERNAL MEDICINE

## 2023-08-22 RX ORDER — HEPARIN SODIUM (PORCINE) LOCK FLUSH IV SOLN 100 UNIT/ML 100 UNIT/ML
5-10 SOLUTION INTRAVENOUS
Status: DISCONTINUED | OUTPATIENT
Start: 2023-08-22 | End: 2023-08-26 | Stop reason: HOSPADM

## 2023-08-22 RX ORDER — HEPARIN SODIUM,PORCINE 10 UNIT/ML
5-10 VIAL (ML) INTRAVENOUS
Status: DISCONTINUED | OUTPATIENT
Start: 2023-08-22 | End: 2023-08-26 | Stop reason: HOSPADM

## 2023-08-22 RX ORDER — VANCOMYCIN HYDROCHLORIDE 1 G/200ML
1000 INJECTION, SOLUTION INTRAVENOUS EVERY 12 HOURS
Status: DISCONTINUED | OUTPATIENT
Start: 2023-08-22 | End: 2023-08-22

## 2023-08-22 RX ORDER — HEPARIN SODIUM,PORCINE 10 UNIT/ML
5-10 VIAL (ML) INTRAVENOUS EVERY 24 HOURS
Status: DISCONTINUED | OUTPATIENT
Start: 2023-08-22 | End: 2023-08-26 | Stop reason: HOSPADM

## 2023-08-22 RX ORDER — CIPROFLOXACIN 2 MG/ML
400 INJECTION, SOLUTION INTRAVENOUS EVERY 12 HOURS
Status: DISCONTINUED | OUTPATIENT
Start: 2023-08-22 | End: 2023-08-23

## 2023-08-22 RX ORDER — IPRATROPIUM BROMIDE AND ALBUTEROL SULFATE 2.5; .5 MG/3ML; MG/3ML
3 SOLUTION RESPIRATORY (INHALATION)
Status: DISCONTINUED | OUTPATIENT
Start: 2023-08-22 | End: 2023-08-25

## 2023-08-22 RX ADMIN — GUAIFENESIN 200 MG: 200 SOLUTION ORAL at 19:13

## 2023-08-22 RX ADMIN — CIPROFLOXACIN 400 MG: 2 INJECTION, SOLUTION INTRAVENOUS at 21:00

## 2023-08-22 RX ADMIN — METOPROLOL SUCCINATE 25 MG: 25 TABLET, EXTENDED RELEASE ORAL at 19:13

## 2023-08-22 RX ADMIN — APIXABAN 5 MG: 5 TABLET, FILM COATED ORAL at 19:13

## 2023-08-22 RX ADMIN — PANTOPRAZOLE SODIUM 20 MG: 20 TABLET, DELAYED RELEASE ORAL at 06:43

## 2023-08-22 RX ADMIN — IPRATROPIUM BROMIDE AND ALBUTEROL SULFATE 3 ML: .5; 3 SOLUTION RESPIRATORY (INHALATION) at 16:04

## 2023-08-22 RX ADMIN — OSELTAMIVIR PHOSPHATE 75 MG: 75 CAPSULE ORAL at 10:08

## 2023-08-22 RX ADMIN — GUAIFENESIN 200 MG: 200 SOLUTION ORAL at 05:10

## 2023-08-22 RX ADMIN — IPRATROPIUM BROMIDE AND ALBUTEROL SULFATE 3 ML: .5; 3 SOLUTION RESPIRATORY (INHALATION) at 19:30

## 2023-08-22 RX ADMIN — IPRATROPIUM BROMIDE AND ALBUTEROL SULFATE 3 ML: .5; 3 SOLUTION RESPIRATORY (INHALATION) at 12:49

## 2023-08-22 RX ADMIN — LEVOTHYROXINE SODIUM 175 MCG: 175 TABLET ORAL at 06:43

## 2023-08-22 RX ADMIN — Medication 5 ML: at 22:05

## 2023-08-22 RX ADMIN — BUPROPION HYDROCHLORIDE 150 MG: 150 TABLET, FILM COATED, EXTENDED RELEASE ORAL at 08:22

## 2023-08-22 RX ADMIN — OSELTAMIVIR PHOSPHATE 75 MG: 75 CAPSULE ORAL at 19:13

## 2023-08-22 RX ADMIN — ACETAMINOPHEN 650 MG: 325 TABLET, FILM COATED ORAL at 05:10

## 2023-08-22 RX ADMIN — GUAIFENESIN 200 MG: 200 SOLUTION ORAL at 10:08

## 2023-08-22 RX ADMIN — ASPIRIN 81 MG: 81 TABLET, COATED ORAL at 08:22

## 2023-08-22 RX ADMIN — SIMVASTATIN 20 MG: 20 TABLET, FILM COATED ORAL at 21:01

## 2023-08-22 RX ADMIN — Medication 5 ML: at 08:21

## 2023-08-22 RX ADMIN — ACETAMINOPHEN 650 MG: 325 TABLET, FILM COATED ORAL at 11:10

## 2023-08-22 RX ADMIN — APIXABAN 5 MG: 5 TABLET, FILM COATED ORAL at 08:22

## 2023-08-22 ASSESSMENT — ACTIVITIES OF DAILY LIVING (ADL)
ADLS_ACUITY_SCORE: 28
ADLS_ACUITY_SCORE: 28
ADLS_ACUITY_SCORE: 26
ADLS_ACUITY_SCORE: 30
ADLS_ACUITY_SCORE: 26
ADLS_ACUITY_SCORE: 28
ADLS_ACUITY_SCORE: 30
ADLS_ACUITY_SCORE: 28

## 2023-08-22 NOTE — ED NOTES
Patient standing at edge of bed to use urinal. All personal belongings including glasses, dentures and home CPAP machine sent with patient at time of transfer to [id number]

## 2023-08-22 NOTE — PLAN OF CARE
RECEIVING UNIT ED HANDOFF REVIEW    ED Nurse Handoff Report was reviewed by: EWA BERNARDO RN on August 21, 2023 at 8:54 PM

## 2023-08-22 NOTE — PLAN OF CARE
Summary: Admitted to ER 8/21/2023 for acute onset of fever and cough. Lives with wife. Recently traveled to [place].   Influenza A positive.   DATE & TIME: 8/22/23 AM shift  Cognitive Concerns/ Orientation: Disoriented to place and situation. Per wife, pt has been forgetful lately. Recent MI.   BEHAVIOR & AGGRESSION TOOL COLOR: green  ABNL VS/O2: On RA when awake, sating 90  %-92%. Febrile this shift, tylenol x 1 given. Denies chest pain or SOB. Lung sounds coarse, and wheezes present. Started on Duoneb. On home bipap when sleeping.   MOBILITY: 1 assist w/ GB. Dyspnea with exertion.   PAIN MANAGMENT: denies pain  DIET:  Regular, fair appetite.   BOWEL/BLADDER:incontinent of urine and bowel. Loose BM x 2, sample sent to lab for c diff/enteric panel.   ABNL LAB: n/a  DRAIN/DEVICES:  Left chest port, hep locked  SKIN: scattered bruises. Dry/flaky skin  TESTS/PROCEDURES: n/a  D/C DAY/GOALS/PLACE:  pending,  once afebrile. Started on Tamiflu.   OTHER IMPORTANT INFO: Robitussin given x1. Wife visited today.

## 2023-08-22 NOTE — PROGRESS NOTES
[facility]    Medicine Progress Note - Hospitalist Service    Date of Admission:  8/21/2023    Assessment & Plan   74 y/o male presented with upper respiratory tract complaints and fevers after recent travel to [place].  Found to be influenza + on presentation and started on tamiflu.      Influenza A  Acute hypoxic respiratory failure:  -  Covid negative.  -  Initially more hypoxic, now improved.  No overt pneumonia on imaging.  Continue tamiflu.  Added some nebs to see if helps with cough/air movement.  May be a component of reactive airways.  No major wheezing, hold on steroids for now.    ADDENDUM 1700:  -  Notified afternoon 8/22 of one + BC with gram + Cocci in clusters.  Varigene not back yet.  Unclear if this represents true staph infection or some other org/contaminant.  Will empirically cover with IV vancomycin while awaiting further culture info.  An additional BC was requested this afternoon.  He does have an old port in place it should be noted but no acute complaints there.    Loose stools:  -  He and his family report loose stools during the trip.  He still is having them.  Unclear if related to his current infection or if he has another superimposed infection.  C. Diff and stool enteric studies requested.  Hold on imodium for now until studies back.    Afib  CAD s/p PCI  - resume home, metoprolol, eliquis, atorvastatin and asa     Lymphoma in remission  Last treatment 7 years ago. He still has a port in place for preference  - on surveillance     Incidental CVA  Noted on MRI imaging obtained as outpatient work up for dizziness 05/2023  - outpatient work up with PCP ongoing  - continue eliquis, asa and statin     Depression  Suspected mild cognitive decline  - Resume home wellbutrin. Patient tapering off prozac, will just hold for now  - wife notes ongoing outpatient work up for some memory problems and patient with some forgetfulness     Hypothyroidism  - levothyroxine    "  KENYA  - Home CPAP      Medical Decision Making       Over 50 MINUTES SPENT BY ME on the date of service doing chart review, history, exam, documentation & further activities per the note.      Labs/Imaging Reviewed:  See Information above and Data section below    PPE Used:  Mask, gown/gloves       Diet: Combination Diet Regular Diet Adult    DVT Prophylaxis: Pneumatic Compression Devices  Springer Catheter: Not present  Lines: PRESENT      Port a Cath 08/21/23 Left Chest wall-Site Assessment: WDL      Cardiac Monitoring: ACTIVE order. Indication: Electrolyte Imbalance (24 hours)- Magnesium <1.3 mg/ml; Potassium < =2.8 or > 5.5 mg/ml  Code Status: Full Code      Clinically Significant Risk Factors Present on Admission          # Hypocalcemia: Lowest Ca = 8.4 mg/dL in last 2 days, will monitor and replace as appropriate      # Drug Induced Coagulation Defect: home medication list includes an anticoagulant medication  # Drug Induced Platelet Defect: home medication list includes an antiplatelet medication        # Obesity: Estimated body mass index is 31.15 kg/m  as calculated from the following:    Height as of this encounter: 1.676 m (5' 6\").    Weight as of this encounter: 87.5 kg (193 lb).              Disposition Plan      Expected Discharge Date: 08/25/2023                  Sreekanth Colin DO  Hospitalist Service  [facility]  Securely message with [employer] (more info)  Text page via [employer] Paging/Directory   ______________________________________________________________________    Interval History   Assumed care for the day, history reviewed.  Mr. Guerrero is feeling \"a little better.\"  SOB improved but still with frequent dry cough.  Denies new pain.  Tolerating some po intake but appetite reduced.  Having some periodic non-bloody loose stools.  Wife present reports family had loose stool issues during trip.    Physical Exam   Vital Signs: Temp: (!) 100.6  F (38.1  C) Temp src: Oral BP: (!) " 147/87 Pulse: 82   Resp: 17 SpO2: 95 % O2 Device: None (Room air) Oxygen Delivery: 2 LPM  Weight: 193 lbs 0 oz    GEN:  Alert, oriented x 3, appears ill but comfortable, no overt distress.  HEENT:  Normocephalic/atraumatic, no scleral icterus, no nasal discharge, mouth moist.  CV:  Regular rate and rhythm, no loud murmur/rub.  LUNGS:  Clear to auscultation bilaterally without rales/rhonchi/wheezing/retractions.  Symmetric chest rise on inhalation noted.  ABD:  Active bowel sounds, soft, non-tender, mildly distended.  No guarding/rigidity.  EXT:  No edema.  No cyanosis.  No acute joint synovitis noted.  SKIN:  Dry to touch, no exanthems noted in the visualized areas.    Medications    - MEDICATION INSTRUCTIONS -        apixaban ANTICOAGULANT  5 mg Oral BID    aspirin  81 mg Oral Daily    buPROPion  150 mg Oral QAM    heparin  5-10 mL Intracatheter Q28 Days    heparin lock flush  5-10 mL Intracatheter Q24H    ipratropium - albuterol 0.5 mg/2.5 mg/3 mL  3 mL Nebulization 4x daily    levothyroxine  175 mcg Oral QAM AC    metoprolol succinate ER  25 mg Oral QPM    oseltamivir  75 mg Oral BID    pantoprazole  20 mg Oral QAM AC    simvastatin  20 mg Oral At Bedtime    sodium chloride (PF)  10-20 mL Intracatheter Q28 Days    sodium chloride (PF)  3 mL Intracatheter Q8H       Data     Labs and Imaging results below reviewed today.    Recent Labs   Lab 08/22/23  0612 08/21/23  1554   WBC 6.1 9.0   HGB 13.4 14.3   HCT 41.4 43.1   MCV 94 93   * 141*     7-Day Micro Results       Collected Updated Procedure Result Status      08/22/2023 1256 08/22/2023 1301 C. difficile Toxin B PCR with reflex to C. difficile Antigen and Toxins A/B EIA [[id number]]   Stool from Per Rectum    In process Component Value   No component results            08/22/2023 1256 08/22/2023 1301 Enteric Bacteria and Virus Panel PCR [[id number]]   Stool from Per Rectum    In process Component Value   No component results            08/21/2023 2301  08/22/2023 1432 Blood Culture Peripheral Blood [[id number]]   Peripheral Blood    Preliminary result Component Value   Culture No growth after 12 hours  [P]                08/21/2023 1600 08/22/2023 1649 Blood Culture Peripheral Blood [[id number]]   (Abnormal)   Peripheral Blood    Preliminary result Component Value   Culture Positive on the 1st day of incubation  [P]     Gram positive cocci in clusters  [P]     1 of 2 bottles               08/21/2023 1600 08/22/2023 1649 Verigene GP Panel [[id number]]   Peripheral Blood    In process Component Value   No component results            08/21/2023 1557 08/21/2023 1658 Symptomatic Influenza A/B, RSV, & SARS-CoV2 PCR (COVID-19) Nasopharyngeal [[id number]]    (Abnormal)   Swab from Nasopharyngeal    Final result Component Value   Influenza A PCR Positive   Influenza B PCR Negative   RSV PCR Negative   SARS CoV2 PCR Negative   NEGATIVE: SARS-CoV-2 (COVID-19) RNA not detected, presumed negative.                  Recent Labs   Lab 08/22/23  0612 08/21/23  1554    135*   POTASSIUM 4.4 4.0   CHLORIDE 103 99   CO2 22 22   ANIONGAP 12 14   * 118*   BUN 14.8 14.9   CR 0.98 1.05   GFRESTIMATED 81 74   BK 8.4* 8.7*   PROTTOTAL  --  6.7   ALBUMIN  --  4.3   BILITOTAL  --  0.5   ALKPHOS  --  46   AST  --  41   ALT  --  22       Recent Results (from the past 24 hour(s))   XR Chest 2 Views    Narrative    EXAM: XR CHEST 2 VIEWS  LOCATION: [facility]  DATE: 8/21/2023    INDICATION: Fever, cough, pneumonia  COMPARISON: None.      Impression    IMPRESSION: Left-sided portacatheter with tip in the SVC. Normal heart size. Coronary arterial stents. No pulmonary infiltrates. Mild hyperinflation.

## 2023-08-22 NOTE — PLAN OF CARE
Summary: Admitted to ER 8/21/2023 for acute onset of fever and cough. Comes from home, lives with wife  DATE & TIME: 8/21/23  arrived on floor around 7921-2304  Cognitive Concerns/ Orientation: Disoriented to place, forgetful. Sets off bed alarm frequently.   BEHAVIOR & AGGRESSION TOOL COLOR: green  ABNL VS/O2: 2L NC/pt's home bi-pap at night . HR controlled. Febrile x1, Tylenol given. Vitals q4.   MOBILITY: 1 assist w/ GB. Dyspnea with exertion.   PAIN MANAGMENT: denies pain  DIET:  Regular  BOWEL/BLADDER: Can be incontinent of urine and bowel. Small amounts in urinal. Had one incontinent BM  ABNL LAB: Positive for flu. Pro calcitonin and CRP elevated. 2 sets of peripheral blood cultures pending  DRAIN/DEVICES:  Left chest port  SKIN: Bruising on bilateral forearms   TESTS/PROCEDURES: tele afib, has known history, on Eliquis and Metoprolol. Had chest x-ray upon admission  D/C DAY/GOALS/PLACE:  home  OTHER IMPORTANT INFO: Robitussin given x1. Urine sample still needed.

## 2023-08-22 NOTE — PROGRESS NOTES
Cross coverage note: Paged by RN because of fever 101.1; he was admitted yesterday and found to have influenza A infection; chest x-ray with no infiltrate; started on Tamiflu; blood cultures are pending; I think that he is a fever is a caused by influenza A infection; will check UA also; Tylenol as needed.    Hoa Huggins, Hospitalist

## 2023-08-22 NOTE — PROGRESS NOTES
MD Notification    Notified Person: Sreekanth Colin DO    Notification Date/Time: 8/22 2430    Purpose of Notification: Critical lab: peripheral blood cultures taken on 8/21 are growing gram positive cocci in clusters.    Orders Received: Vancomycin 1000mg in dextrose 5%, repeat peripheral blood cultures    Comments:

## 2023-08-23 LAB — POTASSIUM SERPL-SCNC: 4 MMOL/L (ref 3.4–5.3)

## 2023-08-23 PROCEDURE — 258N000003 HC RX IP 258 OP 636: Performed by: HOSPITALIST

## 2023-08-23 PROCEDURE — 94640 AIRWAY INHALATION TREATMENT: CPT | Mod: 76

## 2023-08-23 PROCEDURE — 250N000013 HC RX MED GY IP 250 OP 250 PS 637: Performed by: INTERNAL MEDICINE

## 2023-08-23 PROCEDURE — 999N000157 HC STATISTIC RCP TIME EA 10 MIN

## 2023-08-23 PROCEDURE — 84132 ASSAY OF SERUM POTASSIUM: CPT | Performed by: STUDENT IN AN ORGANIZED HEALTH CARE EDUCATION/TRAINING PROGRAM

## 2023-08-23 PROCEDURE — 250N000013 HC RX MED GY IP 250 OP 250 PS 637: Performed by: STUDENT IN AN ORGANIZED HEALTH CARE EDUCATION/TRAINING PROGRAM

## 2023-08-23 PROCEDURE — 250N000011 HC RX IP 250 OP 636: Performed by: STUDENT IN AN ORGANIZED HEALTH CARE EDUCATION/TRAINING PROGRAM

## 2023-08-23 PROCEDURE — 94640 AIRWAY INHALATION TREATMENT: CPT

## 2023-08-23 PROCEDURE — 250N000011 HC RX IP 250 OP 636: Mod: JZ | Performed by: HOSPITALIST

## 2023-08-23 PROCEDURE — 99233 SBSQ HOSP IP/OBS HIGH 50: CPT | Performed by: INTERNAL MEDICINE

## 2023-08-23 PROCEDURE — 87040 BLOOD CULTURE FOR BACTERIA: CPT | Performed by: INTERNAL MEDICINE

## 2023-08-23 PROCEDURE — 250N000009 HC RX 250: Performed by: INTERNAL MEDICINE

## 2023-08-23 PROCEDURE — 36415 COLL VENOUS BLD VENIPUNCTURE: CPT | Performed by: INTERNAL MEDICINE

## 2023-08-23 PROCEDURE — 120N000001 HC R&B MED SURG/OB

## 2023-08-23 RX ORDER — VANCOMYCIN HYDROCHLORIDE 1 G/200ML
1000 INJECTION, SOLUTION INTRAVENOUS EVERY 12 HOURS
Status: DISCONTINUED | OUTPATIENT
Start: 2023-08-23 | End: 2023-08-23

## 2023-08-23 RX ORDER — GUAIFENESIN 600 MG/1
600 TABLET, EXTENDED RELEASE ORAL 2 TIMES DAILY
Status: DISCONTINUED | OUTPATIENT
Start: 2023-08-23 | End: 2023-08-26 | Stop reason: HOSPADM

## 2023-08-23 RX ADMIN — Medication 5 ML: at 14:30

## 2023-08-23 RX ADMIN — VANCOMYCIN HYDROCHLORIDE 1000 MG: 1 INJECTION, SOLUTION INTRAVENOUS at 00:56

## 2023-08-23 RX ADMIN — IPRATROPIUM BROMIDE AND ALBUTEROL SULFATE 3 ML: .5; 3 SOLUTION RESPIRATORY (INHALATION) at 16:01

## 2023-08-23 RX ADMIN — Medication 5 ML: at 02:07

## 2023-08-23 RX ADMIN — GUAIFENESIN 600 MG: 600 TABLET, EXTENDED RELEASE ORAL at 12:12

## 2023-08-23 RX ADMIN — ACETAMINOPHEN 650 MG: 325 TABLET, FILM COATED ORAL at 01:09

## 2023-08-23 RX ADMIN — PANTOPRAZOLE SODIUM 20 MG: 20 TABLET, DELAYED RELEASE ORAL at 06:38

## 2023-08-23 RX ADMIN — OSELTAMIVIR PHOSPHATE 75 MG: 75 CAPSULE ORAL at 20:15

## 2023-08-23 RX ADMIN — APIXABAN 5 MG: 5 TABLET, FILM COATED ORAL at 09:15

## 2023-08-23 RX ADMIN — SIMVASTATIN 20 MG: 20 TABLET, FILM COATED ORAL at 21:08

## 2023-08-23 RX ADMIN — GUAIFENESIN 600 MG: 600 TABLET, EXTENDED RELEASE ORAL at 21:08

## 2023-08-23 RX ADMIN — ASPIRIN 81 MG: 81 TABLET, COATED ORAL at 09:15

## 2023-08-23 RX ADMIN — LEVOTHYROXINE SODIUM 175 MCG: 175 TABLET ORAL at 06:38

## 2023-08-23 RX ADMIN — OSELTAMIVIR PHOSPHATE 75 MG: 75 CAPSULE ORAL at 09:15

## 2023-08-23 RX ADMIN — IPRATROPIUM BROMIDE AND ALBUTEROL SULFATE 3 ML: .5; 3 SOLUTION RESPIRATORY (INHALATION) at 07:43

## 2023-08-23 RX ADMIN — Medication 5 ML: at 06:45

## 2023-08-23 RX ADMIN — IPRATROPIUM BROMIDE AND ALBUTEROL SULFATE 3 ML: .5; 3 SOLUTION RESPIRATORY (INHALATION) at 11:37

## 2023-08-23 RX ADMIN — BUPROPION HYDROCHLORIDE 150 MG: 150 TABLET, FILM COATED, EXTENDED RELEASE ORAL at 09:14

## 2023-08-23 RX ADMIN — APIXABAN 5 MG: 5 TABLET, FILM COATED ORAL at 20:15

## 2023-08-23 RX ADMIN — VANCOMYCIN HYDROCHLORIDE 1750 MG: 10 INJECTION, POWDER, LYOPHILIZED, FOR SOLUTION INTRAVENOUS at 12:12

## 2023-08-23 RX ADMIN — METOPROLOL SUCCINATE 25 MG: 25 TABLET, EXTENDED RELEASE ORAL at 20:12

## 2023-08-23 RX ADMIN — GUAIFENESIN 200 MG: 200 SOLUTION ORAL at 23:45

## 2023-08-23 RX ADMIN — IPRATROPIUM BROMIDE AND ALBUTEROL SULFATE 3 ML: .5; 3 SOLUTION RESPIRATORY (INHALATION) at 19:00

## 2023-08-23 ASSESSMENT — ACTIVITIES OF DAILY LIVING (ADL)
ADLS_ACUITY_SCORE: 28
ADLS_ACUITY_SCORE: 28
ADLS_ACUITY_SCORE: 30
ADLS_ACUITY_SCORE: 28
ADLS_ACUITY_SCORE: 30
ADLS_ACUITY_SCORE: 28
ADLS_ACUITY_SCORE: 30
ADLS_ACUITY_SCORE: 32
ADLS_ACUITY_SCORE: 28

## 2023-08-23 NOTE — PROVIDER NOTIFICATION
MD Notification    Notified Person: MD    Notified Person Name: Geovany Vargas     Notification Date/Time: 08/22/23 @ 23:35    Notification Interaction: [facility] Messaging     Purpose of Notification: BC + for gram + cocci in clusters     Orders Received: Re- ordered vanco     Comments:

## 2023-08-23 NOTE — PLAN OF CARE
Goal Outcome Evaluation:    Summary: Admitted to ER 8/21/2023 for acute onset of fever and cough.  Recently traveled to [place].   Influenza A positive.   DATE & TIME: 8/22/23 1159-4779   Cognitive Concerns/ Orientation: Aox4, forgetful at times  BEHAVIOR & AGGRESSION TOOL COLOR: green  ABNL VS/O2: VSS on RA. Uses home cpap at night  Telemetry: A fib  MOBILITY: 1 assist w/ GB. Dyspnea with exertion.   PAIN MANAGMENT: denies pain  DIET:  Regular, good appetite.   BOWEL/BLADDER: Continent  ABNL LAB: Blood cultures positive for staph epidermidis and gram positive cocci in clusters. Enteric panel positive for enteropathogenic E. Coli- MD paged  DRAIN/DEVICES:  Left chest port, hep locked  SKIN: scattered bruises. Dry/flaky skin  TESTS/PROCEDURES: n/a  D/C DAY/GOALS/PLACE:  pending improvement, Started on Tamiflu.   OTHER IMPORTANT INFO: Robitussin given x1 Lung sounds coarse with expiratory wheezes, started on Duonebs-effective. Productive cough. Denies SOB and chest pain. Started on cipro infusion

## 2023-08-23 NOTE — PLAN OF CARE
Goal Outcome Evaluation:      Plan of Care Reviewed With: patient    Overall Patient Progress: no changeOverall Patient Progress: no change     Summary: Admitted to ER 8/21/2023 for acute onset of fever and cough.  Recently traveled to [place]. Influenza A positive.   DATE & TIME: 08/22-08/23 5064-0176   Cognitive Concerns/ Orientation: A&Ox3-4 disorientated to time. Forgetful   BEHAVIOR & AGGRESSION TOOL COLOR: green- impulsive- reminders needed to use call light before getting out of bed.   ABNL VS/O2: VS on RA. Tmax- 100.2- tylenol given x1 recheck  99.6. Uses home cpap at night  Telemetry: AFIB- on eliquis.   MOBILITY: 1 assist w/ GB. Dyspnea with exertion.   PAIN MANAGMENT: denies pain  DIET:  Regular  BOWEL/BLADDER: Continent- urinal at bedside   ABNL LAB: Blood cultures positive for staph epidermidis and gram positive cocci in clusters. Enteric panel positive for enteropathogenic E. Coli- MD aware-  vanco reordered Q12.   DRAIN/DEVICES:  Left chest port, hep locked  SKIN: scattered bruises. Dry/flaky skin  TESTS/PROCEDURES: n/a  D/C DAY/GOALS/PLACE:  pending improvement   OTHER IMPORTANT INFO:  Lung sounds coarse with expiratory wheezes, receiving Duonebs. Productive cough. Denies SOB and chest pain. Started on cipro infusion Q12. On tamiflu BID, On vanco Q12.

## 2023-08-23 NOTE — PHARMACY-VANCOMYCIN DOSING SERVICE
Pharmacy Vancomycin Initial Note  Date of Service 2023  Patient's  1950  73 year old, male    Indication: Bacteremia    Current estimated CrCl = Estimated Creatinine Clearance: 68.6 mL/min (based on SCr of 0.98 mg/dL).    Creatinine for last 3 days  2023:  3:54 PM Creatinine 1.05 mg/dL  2023:  6:12 AM Creatinine 0.98 mg/dL    Recent Vancomycin Level(s) for last 3 days  No results found for requested labs within last 3 days.      Vancomycin IV Administrations (past 72 hours)        No vancomycin orders with administrations in past 72 hours.                    Nephrotoxins and other renal medications (From now, onward)      Start     Dose/Rate Route Frequency Ordered Stop    23 1800  vancomycin (VANCOCIN) 1000 mg in dextrose 5% 200 mL PREMIX         1,000 mg  200 mL/hr over 1 Hours Intravenous EVERY 12 HOURS 23 1705              Contrast Orders - past 72 hours (72h ago, onward)      None            [employer] Prediction of Planned Initial Vancomycin Regimen  Loading dose: N/A  Regimen: 1000 mg IV every 12 hours.  Start time: 17:06 on 2023  Exposure target: AUC24 (range)400-600 mg/L.hr   AUC24,ss: 567 mg/L.hr  Probability of AUC24 > 400: 85 %  Ctrough,ss: 18.9 mg/L  Probability of Ctrough,ss > 20: 44 %  Probability of nephrotoxicity (Lodise RUBEN ): 15 %        Plan:  Start vancomycin  1000 mg IV q12h.   Vancomycin monitoring method: AUC  Vancomycin therapeutic monitoring goal: 400-600 mg*h/L  Pharmacy will check vancomycin levels as appropriate in 1-3 Days.    Serum creatinine levels will be ordered daily for the first week of therapy and at least twice weekly for subsequent weeks.      Bettie Yi [facility]    
Pharmacy Vancomycin Initial Note  Date of Service 2023  Patient's  1950  73 year old, male    Indication: Bacteremia  (PCN allergy)    Current estimated CrCl = Estimated Creatinine Clearance: 68.6 mL/min (based on SCr of 0.98 mg/dL).    Creatinine for last 3 days  2023:  3:54 PM Creatinine 1.05 mg/dL  2023:  6:12 AM Creatinine 0.98 mg/dL    Recent Vancomycin Level(s) for last 3 days  No results found for requested labs within last 3 days.      Vancomycin IV Administrations (past 72 hours)                     vancomycin (VANCOCIN) 1000 mg in dextrose 5% 200 mL PREMIX (mg) 1,000 mg New Bag 23 0056                    Nephrotoxins and other renal medications (From now, onward)      Start     Dose/Rate Route Frequency Ordered Stop    23 1230  vancomycin (VANCOCIN) 1,750 mg in 0.9% NaCl 500 mL intermittent infusion         1,750 mg  over 2 Hours Intravenous EVERY 24 HOURS 23 0937              Contrast Orders - past 72 hours (72h ago, onward)      None            [employer] Prediction of Planned Initial Vancomycin Regimen  Regimen: 1750 mg IV every 24 hours.  Start time: 12:56 on 2023  Exposure target: AUC24 (range)400-600 mg/L.hr   AUC24,ss: 509 mg/L.hr  Probability of AUC24 > 400: 76 %  Ctrough,ss: 14.1 mg/L  Probability of Ctrough,ss > 20: 21 %  Probability of nephrotoxicity (Lodise RUBEN ): 9 %          Plan:  Start vancomycin  1750 mg IV q24h.   Vancomycin monitoring method: AUC  Vancomycin therapeutic monitoring goal: 400-600 mg*h/L  Pharmacy will check vancomycin levels as appropriate in 1-3 Days.    Serum creatinine levels will be ordered a minimum of twice weekly.      Tang Boothe [facility]    
[phone] [facility]

## 2023-08-23 NOTE — PROGRESS NOTES
MD Notification    Notified Person: Ammon Ramirez    Notification Date/Time: 8/22 1950      Purpose of Notification: Two critical labs. Peripheral culture drawn on 8/21 growing staph epidermidis. Enteric panel is positive for enteropathogenic E. Coli.    Orders Received: Cipro infusion.    Comments:

## 2023-08-23 NOTE — PROGRESS NOTES
[facility]    Medicine Progress Note - Hospitalist Service    Date of Admission:  8/21/2023    Assessment & Plan     Assessment & Plan  72 y/o male presented with upper respiratory tract complaints and fevers after recent travel to [place]. He lives in [place], [place] but was too ill to return home and came to [facility] ED after landing at the airport.  Found to be influenza + on presentation and started on tamiflu.  Also had diarrheal illness (has resolved) and stool cultures positive for Enteropathogenic E coli.  Also has had      Acute Influenza A infection  Acute hypoxic respiratory failure      Fevers  -  Covid negative.  -  Initially more hypoxic, now improved.  No overt pneumonia on imaging.  Continue tamiflu.    Still having a lot of congested coughing  Add mucinex bid  Added some nebs to see if helps with cough/air movement.   Still no wheezing on exam this am     Fever trend improing overnight    2.  Positive Blood cultures, staph epi - 8/21/23    Started on IV vanco - will continue  D/w ID - stated that 1/2 + cultures in the two different peripheral sets drawn from 8/21/23 are positive  ID consult placed - appreciate assistance    ID feels that still likely a contaminant, but will awake repeat blood cultures today   Continue IV vanco until then  Some clinical concern for possible port infection (has had port for about 10 years since treated for lymphoma, per his report)    3. Enteropathogenic E coli      Diarrheal illness, resolved  -  He reports that his diarrhea has resolved  He is uncertain if his family had similar symptoms when I asked him today    Likely this is a self-limited GI illness and he is clinically improved with supportive care alone (no abx).  Will have ID weigh in on abx recs    Continue enteric precautions, for now     No immodium, for now    Noted that pt may have some cognitive decline - will try to confirm with RN how many stools he is truly having.     4.  Afib  CAD s/p PCI  - resume home, metoprolol, eliquis, atorvastatin and asa     5. Lymphoma in remission  Last treatment 7 years ago. He still has a port in place for preference  - on surveillance     6. Incidental CVA  Noted on MRI imaging obtained as outpatient work up for dizziness 05/2023  - outpatient work up with PCP ongoing  - continue eliquis, asa and statin     7. Depression  Suspected mild cognitive decline  - Resume home wellbutrin. Patient tapering off prozac, will just hold for now  - wife notes ongoing outpatient work up for some memory problems and patient with some forgetfulness     8.Hypothyroidism  - levothyroxine     9.KENYA  - Home CPAP          Medical Decision Making       Over 54 MINUTES SPENT BY ME on the date of service doing chart review, history, exam, documentation & further activities per the note.  also spoke with ID on the phone about consultation     Labs/Imaging Reviewed:  See Information above and Data section below     PPE Used:  Mask, gown/gloves         Diet: Combination Diet Regular Diet Adult    DVT Prophylaxis: Pneumatic Compression Devices  Springer Catheter: Not present  Lines: PRESENT      Port a Cath 08/21/23 Left Chest wall-Site Assessment: WDL      Cardiac Monitoring: ACTIVE order. Indication: Electrolyte Imbalance (24 hours)- Magnesium <1.3 mg/ml; Potassium < =2.8 or > 5.5 mg/ml  Code Status: Full Code        Medical Decision Making             Diet: Combination Diet Regular Diet Adult    DVT Prophylaxis: DOAC  Springer Catheter: Not present  Lines: PRESENT      Port a Cath 08/21/23 Left Chest wall-Site Assessment: WDL      Cardiac Monitoring: ACTIVE order. Indication: Electrolyte Imbalance (24 hours)- Magnesium <1.3 mg/ml; Potassium < =2.8 or > 5.5 mg/ml  Code Status: Full Code      Clinically Significant Risk Factors          # Hypocalcemia: Lowest Ca = 8.4 mg/dL in last 2 days, will monitor and replace as appropriate       # Thrombocytopenia: Lowest platelets = 113 in last 2  "days, will monitor for bleeding          # Obesity: Estimated body mass index is 31.15 kg/m  as calculated from the following:    Height as of this encounter: 1.676 m (5' 6\").    Weight as of this encounter: 87.5 kg (193 lb)., PRESENT ON ADMISSION            Disposition Plan     Expected Discharge Date: 08/25/2023                  Beverly Hernandez DO  Hospitalist Service  [facility]  Securely message with [employer] (more info)  Text page via [employer] Paging/Directory   ______________________________________________________________________    Interval History   Seen alone in room this am.  States that he is feeling better this am.  Still coughing.  Denies any loose stools overnight or this am.  No current HA or CP.      Physical Exam   Vital Signs: Temp: 98.3  F (36.8  C) Temp src: Oral BP: 136/77 Pulse: 79   Resp: 19 SpO2: 95 % O2 Device: None (Room air)    Weight: 193 lbs 0 oz    GEN:  Alert, awake, appears mildly uncomfortable with occasional coughing  HEENT:  Normocephalic/atraumatic, no scleral icterus, no nasal discharge  CV:  Regular rate and rhythm, somewhat distant but no loud murmur or JVD.  S1 + S2 noted, no S3 or S4.  LUNGS:  some upper airway rhonchi to ausc ant bilaterally that clears with cough.  Slight diminished air exchange at the bases post bilaterally but no clear rales/rhonchi/wheezing/retractions.  Symmetric chest rise on inhalation noted.  ABD:  Active bowel sounds, soft, non-tender, mildly distended.  No rebound/guarding/rigidity.  EXT:  No significant pretibial edema bilaterally      CHEST WALL:  port examined in left upper chest - no erythema, bruising or fluctuance.  No warmth     Medications    - MEDICATION INSTRUCTIONS -        apixaban ANTICOAGULANT  5 mg Oral BID    aspirin  81 mg Oral Daily    buPROPion  150 mg Oral QAM    [Held by provider] ciprofloxacin  400 mg Intravenous Q12H    heparin  5-10 mL Intracatheter Q28 Days    heparin lock flush  5-10 mL " Intracatheter Q24H    ipratropium - albuterol 0.5 mg/2.5 mg/3 mL  3 mL Nebulization 4x daily    levothyroxine  175 mcg Oral QAM AC    metoprolol succinate ER  25 mg Oral QPM    oseltamivir  75 mg Oral BID    pantoprazole  20 mg Oral QAM AC    simvastatin  20 mg Oral At Bedtime    sodium chloride (PF)  10-20 mL Intracatheter Q28 Days    sodium chloride (PF)  3 mL Intracatheter Q8H    vancomycin  1,750 mg Intravenous Q24H       Data     Labs and Imaging results below reviewed today.  Recent Labs   Lab 08/22/23  0612 08/21/23  1554   WBC 6.1 9.0   HGB 13.4 14.3   HCT 41.4 43.1   MCV 94 93   * 141*     Recent Labs   Lab 08/23/23  0639 08/22/23  0612 08/21/23  1554   NA  --  137 135*   POTASSIUM 4.0 4.4 4.0   CHLORIDE  --  103 99   CO2  --  22 22   ANIONGAP  --  12 14   GLC  --  113* 118*   BUN  --  14.8 14.9   CR  --  0.98 1.05   GFRESTIMATED  --  81 74   BK  --  8.4* 8.7*     7-Day Micro Results       Collected Updated Procedure Result Status      08/23/2023 0849 08/23/2023 0911 Blood Culture Hand, Left [[id number]]   Blood from Hand, Left    In process Component Value   No component results               08/22/2023 1835 08/23/2023 0901 Blood Culture Hand, Left [[id number]]   Blood from Hand, Left    Preliminary result Component Value   Culture No growth after 12 hours  [P]                08/22/2023 1256 08/22/2023 1810 C. difficile Toxin B PCR with reflex to C. difficile Antigen and Toxins A/B EIA [[id number]]    Stool from Per Rectum    Final result Component Value   C Difficile Toxin B by PCR Negative   A negative result does not exclude actual disease due to C. difficile and may be due to improper collection, handling and storage of the specimen or the number of organisms in the specimen is below the detection limit of the assay.            08/22/2023 1256 08/22/2023 1902 Enteric Bacteria and Virus Panel PCR [[id number]]    (Abnormal)   Stool from Per Rectum    Final result Component Value    Campylobacter species Negative   Salmonella species Negative   Vibrio species Negative   Vibrio cholerae Negative   Yersinia enterocolitica Negative   Enteropathogenic E. coli (EPEC) Positive   Shiga-like toxin-producing E. coli (STEC) Negative   Shigella/Enteroinvasive E. coli (EIEC) Negative   Cryptosporidium species Negative   Giardia lamblia Negative   Norovirus Gl/Gll Negative   Rotavirus A Negative   Plesiomonas shigelloides Negative   Enteroaggregative E. coli (EAEC) Negative   Enterotoxigenic E. coli (ETEC) Negative   E. coli O157 NA   Cyclospora cayetanensis Negative   Entamoeba histolytica Negative   Adenovirus F40/41 Negative   Astrovirus Negative   Sapovirus Negative            08/21/2023 2301 08/22/2023 2322 Blood Culture Peripheral Blood [[id number]]   (Abnormal)   Peripheral Blood    Preliminary result Component Value   Culture Positive on the 1st day of incubation  [P]     Gram positive cocci in clusters  [P]     1 of 2 bottles               08/21/2023 1600 08/22/2023 1649 Blood Culture Peripheral Blood [[id number]]   (Abnormal)   Peripheral Blood    Preliminary result Component Value   Culture Positive on the 1st day of incubation  [P]     Gram positive cocci in clusters  [P]     1 of 2 bottles               08/21/2023 1600 08/22/2023 1932 Verigene GP Panel [[id number]]    (Abnormal)   Peripheral Blood    Final result Component Value   Staphylococcus aureus Not Detected   Staphylococcus epidermidis Detected   Positive for Staphylococcus epidermidis and negative for the mecA gene (not resistant to methicillin) by [employer] multiplex nucleic acid test. Final identification and antimicrobial susceptibility testing will be verified by standard methods.   Staphylococcus lugdunensis Not Detected   Enterococcus faecalis Not Detected   Enterococcus faecium Not Detected   Streptococcus species Not Detected   Streptococcus agalactiae Not Detected   Streptococcus anginosus group Not Detected    Streptococcus pneumoniae Not Detected   Streptococcus pyogenes Not Detected   Listeria species Not Detected            08/21/2023 1557 08/21/2023 1658 Symptomatic Influenza A/B, RSV, & SARS-CoV2 PCR (COVID-19) Nasopharyngeal [[id number]]    (Abnormal)   Swab from Nasopharyngeal    Final result Component Value   Influenza A PCR Positive   Influenza B PCR Negative   RSV PCR Negative   SARS CoV2 PCR Negative   NEGATIVE: SARS-CoV-2 (COVID-19) RNA not detected, presumed negative.                  Recent Labs   Lab 08/21/23  1554   LACT 1.4       No results found for this or any previous visit (from the past 24 hour(s)).

## 2023-08-23 NOTE — PLAN OF CARE
Goal Outcome Evaluation:      Plan of Care Reviewed With: patient    Summary: Admitted to ER 8/21/2023 for acute onset of fever and cough.  Recently traveled to [place]. Influenza A positive.   DATE & TIME: 08/23/23 2038-0138  Cognitive Concerns/ Orientation: A&Ox4, forgetful at times, needing frequent reminder to call for assistance before getting OOB or chair.   BEHAVIOR & AGGRESSION TOOL COLOR: green-reminders needed to use call light before getting out of bed.   ABNL VS/O2: VSS, O2 95% RA. Uses home cpap at night and intermittently during days for naps.    Telemetry: Afib with CVR, tele discontinued.   MOBILITY: SBA w/GB, unsteady gait at times, walked the hallways x2.    PAIN MANAGMENT: denies pain  DIET:  Regular  BOWEL/BLADDER: Continent- urinal at bedside   ABNL LAB: Blood cultures positive for staph epidermidis and gram positive cocci in clusters. Enteric panel positive for enteropathogenic E. Coli- MD aware. On IV Vanco daily.   DRAIN/DEVICES:  Left chest port A cath, hep locked  SKIN: scattered bruises. Dry/flaky skin  TESTS/PROCEDURES: n/a  D/C DAY/GOALS/PLACE:  pending improvement   OTHER IMPORTANT INFO:  Lung sounds clear/fine crackle/coarse, receiving Duonebs. Productive cough. Denies SOB and chest pain. On IV vanco daily. Repeat BC drawn, result pending. ID/PT/OT following.                   Had a long conversation with her .  He didn't mention blood in her urine.  They will call me if she develops any problems.

## 2023-08-24 ENCOUNTER — APPOINTMENT (OUTPATIENT)
Dept: PHYSICAL THERAPY | Facility: CLINIC | Age: 73
DRG: 193 | End: 2023-08-24
Attending: INTERNAL MEDICINE
Payer: MEDICARE

## 2023-08-24 ENCOUNTER — APPOINTMENT (OUTPATIENT)
Dept: OCCUPATIONAL THERAPY | Facility: CLINIC | Age: 73
DRG: 193 | End: 2023-08-24
Attending: INTERNAL MEDICINE
Payer: MEDICARE

## 2023-08-24 LAB
ANION GAP SERPL CALCULATED.3IONS-SCNC: 12 MMOL/L (ref 7–15)
BACTERIA BLD CULT: ABNORMAL
BUN SERPL-MCNC: 12.8 MG/DL (ref 8–23)
CALCIUM SERPL-MCNC: 8.6 MG/DL (ref 8.8–10.2)
CHLORIDE SERPL-SCNC: 101 MMOL/L (ref 98–107)
CREAT SERPL-MCNC: 0.91 MG/DL (ref 0.67–1.17)
DEPRECATED HCO3 PLAS-SCNC: 25 MMOL/L (ref 22–29)
ERYTHROCYTE [DISTWIDTH] IN BLOOD BY AUTOMATED COUNT: 14.9 % (ref 10–15)
GFR SERPL CREATININE-BSD FRML MDRD: 88 ML/MIN/1.73M2
GLUCOSE SERPL-MCNC: 98 MG/DL (ref 70–99)
HCT VFR BLD AUTO: 41.2 % (ref 40–53)
HGB BLD-MCNC: 13.3 G/DL (ref 13.3–17.7)
MCH RBC QN AUTO: 30.5 PG (ref 26.5–33)
MCHC RBC AUTO-ENTMCNC: 32.3 G/DL (ref 31.5–36.5)
MCV RBC AUTO: 95 FL (ref 78–100)
PLATELET # BLD AUTO: 142 10E3/UL (ref 150–450)
POTASSIUM SERPL-SCNC: 4.1 MMOL/L (ref 3.4–5.3)
RBC # BLD AUTO: 4.36 10E6/UL (ref 4.4–5.9)
SODIUM SERPL-SCNC: 138 MMOL/L (ref 136–145)
WBC # BLD AUTO: 5.2 10E3/UL (ref 4–11)

## 2023-08-24 PROCEDURE — 82310 ASSAY OF CALCIUM: CPT | Performed by: INTERNAL MEDICINE

## 2023-08-24 PROCEDURE — 999N000157 HC STATISTIC RCP TIME EA 10 MIN

## 2023-08-24 PROCEDURE — 250N000009 HC RX 250: Performed by: INTERNAL MEDICINE

## 2023-08-24 PROCEDURE — 94640 AIRWAY INHALATION TREATMENT: CPT | Mod: 76

## 2023-08-24 PROCEDURE — 97165 OT EVAL LOW COMPLEX 30 MIN: CPT | Mod: GO

## 2023-08-24 PROCEDURE — 250N000011 HC RX IP 250 OP 636: Performed by: STUDENT IN AN ORGANIZED HEALTH CARE EDUCATION/TRAINING PROGRAM

## 2023-08-24 PROCEDURE — 85027 COMPLETE CBC AUTOMATED: CPT | Performed by: INTERNAL MEDICINE

## 2023-08-24 PROCEDURE — 250N000013 HC RX MED GY IP 250 OP 250 PS 637: Performed by: INTERNAL MEDICINE

## 2023-08-24 PROCEDURE — 97530 THERAPEUTIC ACTIVITIES: CPT | Mod: GP

## 2023-08-24 PROCEDURE — 97116 GAIT TRAINING THERAPY: CPT | Mod: GP

## 2023-08-24 PROCEDURE — 99232 SBSQ HOSP IP/OBS MODERATE 35: CPT | Performed by: INTERNAL MEDICINE

## 2023-08-24 PROCEDURE — 97530 THERAPEUTIC ACTIVITIES: CPT | Mod: GO

## 2023-08-24 PROCEDURE — 250N000013 HC RX MED GY IP 250 OP 250 PS 637: Performed by: STUDENT IN AN ORGANIZED HEALTH CARE EDUCATION/TRAINING PROGRAM

## 2023-08-24 PROCEDURE — 94640 AIRWAY INHALATION TREATMENT: CPT

## 2023-08-24 PROCEDURE — 97161 PT EVAL LOW COMPLEX 20 MIN: CPT | Mod: GP

## 2023-08-24 PROCEDURE — 120N000001 HC R&B MED SURG/OB

## 2023-08-24 PROCEDURE — 250N000011 HC RX IP 250 OP 636: Performed by: HOSPITALIST

## 2023-08-24 PROCEDURE — 258N000003 HC RX IP 258 OP 636: Performed by: HOSPITALIST

## 2023-08-24 RX ADMIN — GUAIFENESIN 600 MG: 600 TABLET, EXTENDED RELEASE ORAL at 09:04

## 2023-08-24 RX ADMIN — ASPIRIN 81 MG: 81 TABLET, COATED ORAL at 09:04

## 2023-08-24 RX ADMIN — OSELTAMIVIR PHOSPHATE 75 MG: 75 CAPSULE ORAL at 09:04

## 2023-08-24 RX ADMIN — IPRATROPIUM BROMIDE AND ALBUTEROL SULFATE 3 ML: .5; 3 SOLUTION RESPIRATORY (INHALATION) at 15:12

## 2023-08-24 RX ADMIN — Medication 10 ML: at 06:49

## 2023-08-24 RX ADMIN — Medication 1 LOZENGE: at 02:56

## 2023-08-24 RX ADMIN — OSELTAMIVIR PHOSPHATE 75 MG: 75 CAPSULE ORAL at 20:59

## 2023-08-24 RX ADMIN — Medication 5 ML: at 02:14

## 2023-08-24 RX ADMIN — LEVOTHYROXINE SODIUM 175 MCG: 175 TABLET ORAL at 06:30

## 2023-08-24 RX ADMIN — APIXABAN 5 MG: 5 TABLET, FILM COATED ORAL at 09:04

## 2023-08-24 RX ADMIN — GUAIFENESIN 200 MG: 200 SOLUTION ORAL at 12:43

## 2023-08-24 RX ADMIN — IPRATROPIUM BROMIDE AND ALBUTEROL SULFATE 3 ML: .5; 3 SOLUTION RESPIRATORY (INHALATION) at 07:37

## 2023-08-24 RX ADMIN — Medication 5 ML: at 14:47

## 2023-08-24 RX ADMIN — APIXABAN 5 MG: 5 TABLET, FILM COATED ORAL at 20:59

## 2023-08-24 RX ADMIN — Medication 1 LOZENGE: at 12:43

## 2023-08-24 RX ADMIN — IPRATROPIUM BROMIDE AND ALBUTEROL SULFATE 3 ML: .5; 3 SOLUTION RESPIRATORY (INHALATION) at 20:22

## 2023-08-24 RX ADMIN — GUAIFENESIN 600 MG: 600 TABLET, EXTENDED RELEASE ORAL at 20:59

## 2023-08-24 RX ADMIN — VANCOMYCIN HYDROCHLORIDE 1750 MG: 10 INJECTION, POWDER, LYOPHILIZED, FOR SOLUTION INTRAVENOUS at 12:44

## 2023-08-24 RX ADMIN — METOPROLOL SUCCINATE 25 MG: 25 TABLET, EXTENDED RELEASE ORAL at 21:00

## 2023-08-24 RX ADMIN — PANTOPRAZOLE SODIUM 20 MG: 20 TABLET, DELAYED RELEASE ORAL at 06:30

## 2023-08-24 RX ADMIN — BUPROPION HYDROCHLORIDE 150 MG: 150 TABLET, FILM COATED, EXTENDED RELEASE ORAL at 09:04

## 2023-08-24 RX ADMIN — IPRATROPIUM BROMIDE AND ALBUTEROL SULFATE 3 ML: .5; 3 SOLUTION RESPIRATORY (INHALATION) at 12:28

## 2023-08-24 ASSESSMENT — ACTIVITIES OF DAILY LIVING (ADL)
ADLS_ACUITY_SCORE: 25
ADLS_ACUITY_SCORE: 25
ADLS_ACUITY_SCORE: 28
ADLS_ACUITY_SCORE: 28
ADLS_ACUITY_SCORE: 25
ADLS_ACUITY_SCORE: 28
ADLS_ACUITY_SCORE: 28
ADLS_ACUITY_SCORE: 25
ADLS_ACUITY_SCORE: 28
ADLS_ACUITY_SCORE: 25

## 2023-08-24 NOTE — PROGRESS NOTES
[facility]    Medicine Progress Note - Hospitalist Service    Date of Admission:  8/21/2023    Assessment & Plan     Assessment & Plan  74 y/o male presented with upper respiratory tract complaints and fevers after recent travel to [place] [employer]. He lives in [place], [place] but was too ill to return home and came to [facility] ED after landing at the airport.  Found to be influenza + on presentation and started on tamiflu.  Also had diarrheal illness (has resolved) and stool cultures positive for Enteropathogenic E coli.       Acute Influenza A infection  Acute hypoxic respiratory failure      Fevers  -  Covid negative.  -  Initially more hypoxic, now improved.  No overt pneumonia on imaging.  Continue tamiflu for 5 days  Congested coughing seems to be improving today  Continue mucinex bid  Added some nebs to see if helps with cough/air movement.     Fevers have resolved    2.  Positive Blood cultures, staph epi - 8/21/23    Started on IV vanco - will continue as per ID recs on the phone 8/23/23  D/w ID (Dr. Aguilar) 8/23/23 - stated that 1/2 + cultures in the two different peripheral sets drawn from 8/21/23 are positive  ID consult placed and is pending     Staph epi may still be a contaminant - will await ID recs    Less clinical concern for possible port infection today but still a possibility  (has had port for about 10 years since treated for lymphoma, per his report)    Addendum - 1220  Spoke again with Dr. Aguilar.  She recommends continue IV vanco today.  If repeat blood cultures from 8/23/23 still negative tomorrow (8/25) - then can stop IV vanco and discharge home    Still recommending re-consideration of PORT removal in f/up with PCP as risk of infection (d/w him at bedside again today)    3. Enteropathogenic E coli      Diarrheal illness, resolved  He reports that his diarrhea has resolved (confirmed with RN)  He is uncertain if his family had similar symptoms when I asked him  today    Likely this is a self-limited GI illness and he is clinically improved with supportive care alone   Continue enteric precautions, for now     No immodium, for now  No abx      4. Afib      CAD s/p PCI  - resume home, metoprolol, eliquis, atorvastatin and asa     5. Lymphoma in remission  Last treatment 7 years ago. He still has a port in place for preference  - on surveillance     6. Incidental CVA  Noted on MRI imaging obtained as outpatient work up for dizziness 05/2023  - outpatient work up with PCP ongoing  - continue eliquis, asa and statin     7. Depression  Suspected mild cognitive decline  - Resume home wellbutrin. Patient tapering off prozac, will just hold for now  - wife notes ongoing outpatient work up for some memory problems and patient with some forgetfulness     8.Hypothyroidism  - levothyroxine     9.KENYA  - Home CPAP    10.  Cognitive issues  This is noted in his chart  PT, OT, SW to see today for further recommendations          Medical Decision Making       Over 48 MINUTES SPENT BY ME on the date of service doing chart review, history, exam, documentation & further activities per the note.  also spoke with ID on the phone about consultation     Labs/Imaging Reviewed:  See Information above and Data section below     PPE Used:  Mask, gown/gloves         Diet: Combination Diet Regular Diet Adult    DVT Prophylaxis: Pneumatic Compression Devices  Springer Catheter: Not present  Lines: PRESENT      Port a Cath 08/21/23 Left Chest wall-Site Assessment: WDL      Cardiac Monitoring: ACTIVE order. Indication: Electrolyte Imbalance (24 hours)- Magnesium <1.3 mg/ml; Potassium < =2.8 or > 5.5 mg/ml  Code Status: Full Code        Medical Decision Making             Diet: Combination Diet Regular Diet Adult    DVT Prophylaxis: DOAC  Springer Catheter: Not present  Lines: PRESENT      Port a Cath 08/21/23 Left Chest wall-Site Assessment: WDL      Cardiac Monitoring: None  Code Status: Full Code   "    Clinically Significant Risk Factors                           # Obesity: Estimated body mass index is 31.15 kg/m  as calculated from the following:    Height as of this encounter: 1.676 m (5' 6\").    Weight as of this encounter: 87.5 kg (193 lb)., PRESENT ON ADMISSION            Disposition Plan     Awaiting ID find recs for IV vanco  PT/OT/SW consults  Medically may be able to discharge home later today or in am            Beverly Hernandez DO  Hospitalist Service  [employer] [facility]  Securely message with [employer] (more info)  Text page via [facility] Paging/Directory   ______________________________________________________________________    Interval History   Seen with RN in the room this am. Up in chair and feeling \"much better\".  Cough persists but is less.  Slept well.  No HA, CP or new abd symptoms.  Appetite ok, no N/V.  No fevers overnight or this am.     States that his wife went home to [place].  Wishes that he could walk more in the room and increase his activity.      States that he would be hesistent to remove his PORT unless clearly infected as it has been helpful for him in the last few years.      Physical Exam   Vital Signs: Temp: 97.4  F (36.3  C) Temp src: Oral BP: 132/84 Pulse: 67   Resp: 20 SpO2: 93 % O2 Device: BiPAP/CPAP (CPAP)    Weight: 193 lbs 0 oz    GEN:  Alert, awake, appears more energized this am, more talkative and coughing less while sitting comfortably up in his chair  HEENT:  Normocephalic/atraumatic, no scleral icterus, no nasal discharge  CV:  Regular rate and rhythm, no loud murmur   S1 + S2 noted, no S3 or S4.  LUNGS:  Improved air exchange throughout post and ant lung fields this am.  No clear rales/rhonchi/wheezing/retractions.  Symmetric chest rise on inhalation noted.  ABD:  Active bowel sounds, soft, non-tender, mildly distended.  No rebound/guarding/rigidity.  EXT:  No significant pretibial edema bilaterally    CHEST WALL:  port examined in left " upper chest - no new erythema, bruising or fluctuance.      Medications    - MEDICATION INSTRUCTIONS -        apixaban ANTICOAGULANT  5 mg Oral BID    aspirin  81 mg Oral Daily    buPROPion  150 mg Oral QAM    guaiFENesin  600 mg Oral BID    heparin  5-10 mL Intracatheter Q28 Days    heparin lock flush  5-10 mL Intracatheter Q24H    ipratropium - albuterol 0.5 mg/2.5 mg/3 mL  3 mL Nebulization 4x daily    levothyroxine  175 mcg Oral QAM AC    metoprolol succinate ER  25 mg Oral QPM    oseltamivir  75 mg Oral BID    pantoprazole  20 mg Oral QAM AC    simvastatin  20 mg Oral At Bedtime    sodium chloride (PF)  10-20 mL Intracatheter Q28 Days    sodium chloride (PF)  3 mL Intracatheter Q8H    vancomycin  1,750 mg Intravenous Q24H       Data     Labs and Imaging results below reviewed today.  Recent Labs   Lab 08/24/23  0636 08/22/23  0612 08/21/23  1554   WBC 5.2 6.1 9.0   HGB 13.3 13.4 14.3   HCT 41.2 41.4 43.1   MCV 95 94 93   * 113* 141*     Recent Labs   Lab 08/23/23  0639 08/22/23  0612 08/21/23  1554   NA  --  137 135*   POTASSIUM 4.0 4.4 4.0   CHLORIDE  --  103 99   CO2  --  22 22   ANIONGAP  --  12 14   GLC  --  113* 118*   BUN  --  14.8 14.9   CR  --  0.98 1.05   GFRESTIMATED  --  81 74   BK  --  8.4* 8.7*     7-Day Micro Results       Collected Updated Procedure Result Status      08/23/2023 1217 08/24/2023 0331 Blood Culture Hand, Left [[id number]]   Blood from Hand, Left    Preliminary result Component Value   Culture No growth after 12 hours  [P]                08/23/2023 0849 08/24/2023 0116 Blood Culture Hand, Left [[id number]]   Blood from Hand, Left    Preliminary result Component Value   Culture No growth after 12 hours  [P]                08/22/2023 1835 08/23/2023 2101 Blood Culture Hand, Left [[id number]]   Blood from Hand, Left    Preliminary result Component Value   Culture No growth after 1 day  [P]                08/22/2023 1256 08/22/2023 1810 C. difficile Toxin B PCR with reflex  to C. difficile Antigen and Toxins A/B EIA [[id number]]    Stool from Per Rectum    Final result Component Value   C Difficile Toxin B by PCR Negative   A negative result does not exclude actual disease due to C. difficile and may be due to improper collection, handling and storage of the specimen or the number of organisms in the specimen is below the detection limit of the assay.            08/22/2023 1256 08/22/2023 1902 Enteric Bacteria and Virus Panel PCR [[id number]]    (Abnormal)   Stool from Per Rectum    Final result Component Value   Campylobacter species Negative   Salmonella species Negative   Vibrio species Negative   Vibrio cholerae Negative   Yersinia enterocolitica Negative   Enteropathogenic E. coli (EPEC) Positive   Shiga-like toxin-producing E. coli (STEC) Negative   Shigella/Enteroinvasive E. coli (EIEC) Negative   Cryptosporidium species Negative   Giardia lamblia Negative   Norovirus Gl/Gll Negative   Rotavirus A Negative   Plesiomonas shigelloides Negative   Enteroaggregative E. coli (EAEC) Negative   Enterotoxigenic E. coli (ETEC) Negative   E. coli O157 NA   Cyclospora cayetanensis Negative   Entamoeba histolytica Negative   Adenovirus F40/41 Negative   Astrovirus Negative   Sapovirus Negative            08/21/2023 2301 08/23/2023 1319 Blood Culture Peripheral Blood [[id number]]   (Abnormal)   Peripheral Blood    Preliminary result Component Value   Culture Positive on the 1st day of incubation  [P]     Staphylococcus epidermidis  [P]     1 of 2 bottlesSusceptibilities done on previous cultures               08/21/2023 1600 08/24/2023 0025 Blood Culture Peripheral Blood [[id number]]    (Abnormal)   Peripheral Blood    Preliminary result Component Value   Culture Positive on the 1st day of incubation  [P]     Staphylococcus epidermidis  [P]     1 of 2 bottles        Susceptibility        Staphylococcus epidermidis      CHARLES      Ciprofloxacin <=0.5 ug/mL Susceptible      Clindamycin  "0.25 ug/mL Susceptible      Daptomycin <=0.12 ug/mL Susceptible      Doxycycline <=0.5 ug/mL Susceptible      Erythromycin <=0.25 ug/mL Susceptible      Gentamicin <=0.5 ug/mL Susceptible      Levofloxacin <=0.12 ug/mL Susceptible      Oxacillin <=0.25 ug/mL Susceptible  [1]       Tetracycline <=1 ug/mL Susceptible      Vancomycin 1 ug/mL Susceptible                   [1]  Oxacillin susceptible isolates are susceptible to cephalosporins (example: cefazolin and cephalexin) and beta lactam combination agents. Oxacillin resistant isolates are resistant to these agents.              Susceptibility Comments       Staphylococcus epidermidis    Antibiotics listed as \"No Interpretation\" have no regulatory guidelines for susceptibility/resistance available.               08/21/2023 1600 08/22/2023 1932 Verigene GP Panel [[id number]]    (Abnormal)   Peripheral Blood    Final result Component Value   Staphylococcus aureus Not Detected   Staphylococcus epidermidis Detected   Positive for Staphylococcus epidermidis and negative for the mecA gene (not resistant to methicillin) by [employer] multiplex nucleic acid test. Final identification and antimicrobial susceptibility testing will be verified by standard methods.   Staphylococcus lugdunensis Not Detected   Enterococcus faecalis Not Detected   Enterococcus faecium Not Detected   Streptococcus species Not Detected   Streptococcus agalactiae Not Detected   Streptococcus anginosus group Not Detected   Streptococcus pneumoniae Not Detected   Streptococcus pyogenes Not Detected   Listeria species Not Detected            08/21/2023 1557 08/21/2023 1658 Symptomatic Influenza A/B, RSV, & SARS-CoV2 PCR (COVID-19) Nasopharyngeal [[id number]]    (Abnormal)   Swab from Nasopharyngeal    Final result Component Value   Influenza A PCR Positive   Influenza B PCR Negative   RSV PCR Negative   SARS CoV2 PCR Negative   NEGATIVE: SARS-CoV-2 (COVID-19) RNA not detected, presumed negative.       "            Recent Labs   Lab 08/21/23  1554   LACT 1.4       No results found for this or any previous visit (from the past 24 hour(s)).

## 2023-08-24 NOTE — PROGRESS NOTES
08/24/23 1425   Appointment Info   Signing Clinician's Name / Credentials (OT) Miranda Lala, OTR/L   Living Environment   People in Home spouse   Current Living Arrangements house   Home Accessibility stairs to enter home;stairs within home   Number of Stairs, Main Entrance 5   Stair Railings, Main Entrance railings safe and in good condition   Number of Stairs, Within Home, Primary greater than 10 stairs  (full flight up to bed/bath)   Stair Railings, Within Home, Primary railings safe and in good condition   Transportation Anticipated family or friend will provide   Living Environment Comments Pt lives at home w/ spouse, 5 BENNETT, walk in shower w/ shower chair and grab bars   Self-Care   Usual Activity Tolerance good   Regular Exercise Yes   Activity/Exercise Type walking;swimming   Equipment Currently Used at Home cane, straight   Fall history within last six months no   Activity/Exercise/Self-Care Comment Pt independent w/ ADL tasks and mobility at baseline, uses cane as needed w/ R knee pain   Instrumental Activities of Daily Living (IADL)   IADL Comments Pt shares home mgmt w/ spouse, drives and manages his own meds w/ pill box   General Information   Onset of Illness/Injury or Date of Surgery 08/21/23   Referring Physician Beverly Hernandez, DO   Patient/Family Therapy Goal Statement (OT) feel better and go home   Additional Occupational Profile Info/Pertinent History of Current Problem 72 y/o male presented with upper respiratory tract complaints and fevers after recent travel to [place]. He lives in [place], [place] but was too ill to return home and came to [facility] ED after landing at the airport.  Found to be influenza + on presentation and started on tamiflu.  Also had diarrheal illness (has resolved) and stool cultures positive for Enteropathogenic E coli.   Existing Precautions/Restrictions fall   Cognitive Status Examination   Orientation Status orientation to person, place and time    Safety Deficit minimal deficit;at risk behavior observed;impulsivity;judgment   Cognitive Status Comments Pt oriented x4 and following commands consistently, though does have some impulsivity, dec judgement. Needs increased cues for safety throughout session. Will continue to monitor and screen further.   Visual Perception   Visual Impairment/Limitations WFL;corrective lenses full-time   Pain Assessment   Patient Currently in Pain No   Range of Motion Comprehensive   Comment, General Range of Motion BUE WFL   Strength Comprehensive (MMT)   Comment, General Manual Muscle Testing (MMT) Assessment BUE WFL   Transfers   Transfers sit-stand transfer;toilet transfer   Sit-Stand Transfer   Sit-Stand [place] (Transfers) contact guard   Toilet Transfer   Type (Toilet Transfer) stand-sit;sit-stand   [place] Level (Toilet Transfer) contact guard   Balance   Balance Comments pt mildly unsteady at times w/out AD   Activities of Daily Living   BADL Assessment/Intervention upper body dressing;lower body dressing;grooming;toileting;bathing   Bathing Assessment/Intervention   [place] Level (Bathing) minimum assist (75% patient effort)   Upper Body Dressing Assessment/Training   Comment, (Upper Body Dressing) per clinical judgement   [place] Level (Upper Body Dressing) supervision   Lower Body Dressing Assessment/Training   [place] Level (Lower Body Dressing) supervision   Grooming Assessment/Training   [place] Level (Grooming) supervision   Comment, (Grooming) per clinical judgement   Toileting   Comment, (Toileting) per clinical judgement   [place] Level (Toileting) supervision   Clinical Impression   Criteria for Skilled Therapeutic Interventions Met (OT) Yes, treatment indicated   OT Diagnosis decreased I/ADL independence   OT Problem List-Impairments impacting ADL problems related to;activity tolerance impaired;balance;cognition;mobility;strength   Assessment of Occupational Performance 3-5  Performance Deficits   Identified Performance Deficits decrased independence w/ bathing, functional mobility, med mgmt, driving   Planned Therapy Interventions (OT) ADL retraining;IADL retraining;cognition;strengthening;transfer training;home program guidelines;progressive activity/exercise;risk factor education   Clinical Decision Making Complexity (OT) low complexity   Anticipated Equipment Needs Upon Discharge (OT)   (likely none needed)   Risk & Benefits of therapy have been explained evaluation/treatment results reviewed;care plan/treatment goals reviewed;risks/benefits reviewed;current/potential barriers reviewed;participants voiced agreement with care plan;participants included;patient   OT Total Evaluation Time   OT Eval, Low Complexity Minutes (14464) 10   OT Goals   Therapy Frequency (OT) Daily   OT Predicted Duration/Target Date for Goal Attainment 08/31/23   OT Goals Hygiene/Grooming;Upper Body Dressing;Lower Body Dressing;Toilet Transfer/Toileting;Cognition;OT Goal 1;Upper Body Bathing;Home Management   OT: Hygiene/Grooming modified independent;while standing   OT: Upper Body Dressing Modified independent;including set-up/clothing retrieval   OT: Lower Body Dressing Modified independent;including set-up/clothing retrieval   OT: Upper Body Bathing Modified independent;using adaptive equipment   OT: Toilet Transfer/Toileting Modified independent;toilet transfer;cleaning and garment management;using adaptive equipment   OT: Home Management Modified independent;with light demand household tasks   OT: Cognitive Patient/caregiver will verbalize understanding of cognitive assessment results/recommendations as needed for safe discharge planning   OT: Goal 1 Pt will accurately complete med mgmt task w/out need for VC to facilitate safety at home.   Interventions   Interventions Quick Adds Therapeutic Activity   Therapeutic Activities   Therapeutic Activity Minutes (67405) 15   Symptoms noted during/after  treatment fatigue;shortness of breath   Treatment Detail/Skilled Intervention Pt in chair upon arrival, agrees to OT. AOx4 but some impulsivity noted, dec safety awareness. Pt stood from chair w/ CGA and no AD. Ambulated to bathroom w/ CGA, does have 1 LOB but able to self correct. Cues for pacing as pt moves quickly. CGA toilet transfer. Pt walked additional ~20ft in room w/ CGA-Min A at times. Pt SOB so rested in chair. SpO2 93% - ed pt on PLB and he was able to demo. Pt then engaged in seated UE exercise to promote increased strength/activity tolerance for I/ADL tasks. Completed 10-15 of the following: shoulder flex, horizontal ab/adduction, chest press, arm circles (forwards and back). Pt taking short rest breaks between sets, mild SOB noted but VSS. Stood from chair and marched 60sec w/ CGA, then rested before marching another 30sec. Pt fatigued at end of session, left in chair w/ all needs met and RN in room.   OT Discharge Planning   OT Plan SLUMS, med mgmt task, standing tolerance for ADL tasks   OT Discharge Recommendation (DC Rec) home with assist   OT Rationale for DC Rec Pt functioning below baseline, limited by decreased activity tolerance, weakness, and some confusion. Anticipate he will progress to be safe for return home w/ spouse providing assist for bathing, meds, cleaning, cooking, laundry, and driving.   OT Brief overview of current status mainly CGA w/out AD but pt has 1 LOB during session. SBA LB dressing, SOB w/ activity   Total Session Time   Timed Code Treatment Minutes 15   Total Session Time (sum of timed and untimed services) 25

## 2023-08-24 NOTE — PLAN OF CARE
Goal Outcome Evaluation:  Summary: Admitted to ER 8/21/2023 for acute onset of fever and cough.  Recently traveled to [place]. Influenza A positive.   DATE & TIME: 08/23/23 0054-9948  Cognitive Concerns/ Orientation: A&Ox4, forgetful at times, needs constant reminder to call before getting up.  BEHAVIOR & AGGRESSION TOOL COLOR: green  ABNL VS/O2: VSS, O2 95% RA. Uses home cpap at night and intermittently during days for naps.    Telemetry: NA  MOBILITY: SBA w/GB, gait can be unsteady at times,  Pt OOB walking in room x2 SBA w/ GB, needs reminder to call first, will jump out of bed or chair without warning/ calling.   PAIN MANAGMENT: denies pain  DIET:  Regular  BOWEL/BLADDER: Continent- urinal at bedside   ABNL LAB: Blood cultures positive for staph epidermidis and gram positive cocci in clusters. Enteric panel positive for enteropathogenic E. Coli- MD aware. On IV Vanco daily.   DRAIN/DEVICES:  Left chest port A cath, hep locked  SKIN: scattered bruises. Dry/flaky skin  TESTS/PROCEDURES: n/a  D/C DAY/GOALS/PLACE:  pending improvement   OTHER IMPORTANT INFO:  Lung sounds clear/fine crackle/coarse, receiving Duonebs. Productive cough. THOMSON. Paged MD for lozenges per pt request. ID/PT/OT following. Robitussin given x1, effective per pt.

## 2023-08-24 NOTE — PROGRESS NOTES
08/24/23 1700   Appointment Info   Signing Clinician's Name / Credentials (PT) Chase Garcia DPT   Living Environment   People in Home spouse   Current Living Arrangements house   Home Accessibility stairs to enter home;stairs within home   Number of Stairs, Main Entrance 5   Stair Railings, Main Entrance railings safe and in good condition   Number of Stairs, Within Home, Primary greater than 10 stairs   Stair Railings, Within Home, Primary railings safe and in good condition   Transportation Anticipated family or friend will provide   Living Environment Comments Pt lives at home w/ spouse who is capable of providing 24/7 assist, 5 BENNETT, walk in shower w/ shower chair and grab bars   Self-Care   Usual Activity Tolerance good   Regular Exercise Yes   Activity/Exercise Type walking;swimming   Equipment Currently Used at Home none   Fall history within last six months no   Activity/Exercise/Self-Care Comment Pt reports IND with mobility and ADL's baseline, uses walking poles for longer distance walking due to 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 He lives in [place], [place] but was too ill to return home and came to [facility] ED after landing at the airport.  Found to be influenza + on presentation and started on tamiflu.  Also had diarrheal illness (has resolved) and stool cultures positive for Enteropathogenic E coli.   Existing Precautions/Restrictions fall   Cognition   Affect/Mental Status (Cognition) WNL   Orientation Status (Cognition) oriented x 4   Follows Commands (Cognition) WNL   Pain Assessment   Patient  Currently in Pain No   Integumentary/Edema   Integumentary/Edema no deficits were identifed   Posture    Posture Not impaired   Range of Motion (ROM)   Range of Motion ROM is WFL   Strength (Manual Muscle Testing)   Strength (Manual Muscle Testing) strength is WFL   Bed Mobility   Comment, (Bed Mobility) supine>sit SBA   Transfers   Comment, (Transfers) \sit<>stand SBA   Gait/Stairs (Locomotion)   [place] Level (Gait) supervision   Distance in Feet 10'   Distance in Feet (Gait) 60' + 60'   Pattern (Gait) step-through   Deviations/Abnormal Patterns (Gait) base of support, wide;gait speed decreased;festinating/shuffling;stride length decreased;weight shifting decreased   Balance   Balance Comments sitting balance good, standing balance good but unable to stand with feet together, deficits with dynamic balance   Sensory Examination   Sensory Perception Comments pt reports bilat LE neuropathy at baseline   Clinical Impression   Criteria for Skilled Therapeutic Intervention Yes, treatment indicated   PT Diagnosis (PT) impaired gait   Influenced by the following impairments deficits with functional balance and activity tolerance   Functional limitations due to impairments decreased ind with ambulation and mobility needing SBA due to fall risk   Clinical Presentation (PT Evaluation Complexity) Stable/Uncomplicated   Clinical Presentation Rationale PMH and clinical judgement   Clinical Decision Making (Complexity) low complexity   Planned Therapy Interventions (PT) balance training;bed mobility training;cryotherapy;gait training;home exercise program;patient/family education;ROM (range of motion);strengthening;stair training;stretching;transfer training;progressive activity/exercise   Risk & Benefits of therapy have been explained evaluation/treatment results reviewed;care plan/treatment goals reviewed;risks/benefits reviewed;current/potential barriers reviewed;participants voiced agreement with care plan;participants  included;patient   PT Total Evaluation Time   PT Eval, Low Complexity Minutes (45328) 10   Physical Therapy Goals   PT Frequency Daily   PT Predicted Duration/Target Date for Goal Attainment 08/27/23   PT Goals Bed Mobility;Transfers;Gait;Stairs   PT: Bed Mobility Independent;Supine to/from sit;Rolling;Bridging   PT: Transfers Independent;Sit to/from stand   PT: Gait Independent;150 feet   PT: Stairs Modified independent;Greater than 10 stairs   Interventions   Interventions Quick Adds Gait Training;Therapeutic Activity;Therapeutic Procedure   Therapeutic Activity   Therapeutic Activities: dynamic activities to improve functional performance Minutes (36593) 12   Symptoms Noted During/After Treatment Fatigue   Treatment Detail/Skilled Intervention Eval completed, treatment initiated. Pt performed supine>sit SBA moving well. Pt performed STS x 3 during session without AD, cues for set up to scoot hips closer to EOB. Pt instructed to perform 5xSTS from chair in room and pt performed in ~14 sec which is slightly higher for aged match norm which is 12.6 sec for 70-79 but pt appeared steady throughtout. Cued pt for following: standing feet together and pt could not without holding onto therapist for support, heel to toe walking 4 x 10' with CGA from therapist pt moving slow with LOB every few steps and was difficult for pt, side-stepping L<>R x 4 each side x  10' pt performed better with only occassional mild LOB but able to recover independently. Pt left sitting up in recliner at end of session with call light in reach and chair alarm turned on   Gait Training   Gait Training Minutes (61229) 8   Symptoms Noted During/After Treatment (Gait Training) fatigue;shortness of breath   Treatment Detail/Skilled Intervention Pt amb in room without use of AD CGA progressing to SBA, pt used slow step through pattern, mildly ataxic at times with wider HUGH, cues for pacing especially on turns where pt tends to have mild LOB but able  to recover on own. Between bouts pt sat EOB to rest with mild SOB but stated he felt good overall. Vitals monitored with pt sating between 90-95 throughout session with HR 70-85 with activity, BP cuff was not reading properly after several attempts, pt denied any light-headedness during session   [place] Level (Gait Training) stand-by assist   Physical Assistance Level (Gait Training) supervision   PT Discharge Planning   PT Plan progress gait distance consider AD longer distance, trial steps, dynamic balance challenges   PT Discharge Recommendation (DC Rec) home with assist;home with outpatient physical therapy   PT Rationale for DC Rec Pt was moving farily well but is slightly below baseline of being IND with all mobility, SBA for all mobility at this time. Currently has mild limitations in activity tolerance and increased deficits with balance which pt says he has at baseline but feels more pronounced at this time. Pt would benefit from continued skilled PT in IP at this time and anticipat pt will progress to be able to discharge home with assist as needed from spouse. Pt would benefit from continued skilled PT in OP setting to address any remaining balance deficits with pt reports he has had for some time now.   PT Brief overview of current status SBA for all mobility   Total Session Time   Timed Code Treatment Minutes 20   Total Session Time (sum of timed and untimed services) 30

## 2023-08-24 NOTE — CONSULTS
Received Infectious disease consult for   1/2 positive blood cultures on 2 separate sets in setting of a PORT in place for 10 yrs.   The port is currently not in use     Other issues:   Influenza - A on tamiflu   Enteropathogenic E coli ( avoid antibiotics)     Repeat blood cultures are negative so far.     Continue on vancomycin till repeat blood cultures are back if negative stop antibiotics     If port in place for 10 yr and not being used best remove     Discussed with IM on 8/ 23 and on 8/ 24 if repeat blood cultures positive please reconsult us. Other wise following patient peripherally     Peng Aguilar MD  Infectious Disease

## 2023-08-24 NOTE — PROVIDER NOTIFICATION
MD Notification    Notified Person: MD    Notified Person Name: Xu MD Marito     Notification Date/Time: 08/24/23 0235    Notification Interaction: amcom    Purpose of Notification:  pt is stating throat is sore from coughing and was wondering about some lozenges.    Orders Received: benzocaine-menthol (CHLORASEPTIC) 6-10 MG lozenge 1 lozenge PRN     Comments:

## 2023-08-25 ENCOUNTER — APPOINTMENT (OUTPATIENT)
Dept: OCCUPATIONAL THERAPY | Facility: CLINIC | Age: 73
DRG: 193 | End: 2023-08-25
Payer: MEDICARE

## 2023-08-25 ENCOUNTER — APPOINTMENT (OUTPATIENT)
Dept: PHYSICAL THERAPY | Facility: CLINIC | Age: 73
DRG: 193 | End: 2023-08-25
Payer: MEDICARE

## 2023-08-25 PROCEDURE — 250N000009 HC RX 250: Performed by: INTERNAL MEDICINE

## 2023-08-25 PROCEDURE — 97530 THERAPEUTIC ACTIVITIES: CPT | Mod: GO

## 2023-08-25 PROCEDURE — 97530 THERAPEUTIC ACTIVITIES: CPT | Mod: GP

## 2023-08-25 PROCEDURE — 99232 SBSQ HOSP IP/OBS MODERATE 35: CPT | Performed by: INTERNAL MEDICINE

## 2023-08-25 PROCEDURE — 999N000157 HC STATISTIC RCP TIME EA 10 MIN

## 2023-08-25 PROCEDURE — 250N000011 HC RX IP 250 OP 636: Performed by: STUDENT IN AN ORGANIZED HEALTH CARE EDUCATION/TRAINING PROGRAM

## 2023-08-25 PROCEDURE — 120N000001 HC R&B MED SURG/OB

## 2023-08-25 PROCEDURE — 97116 GAIT TRAINING THERAPY: CPT | Mod: GP

## 2023-08-25 PROCEDURE — 250N000013 HC RX MED GY IP 250 OP 250 PS 637: Performed by: INTERNAL MEDICINE

## 2023-08-25 PROCEDURE — 250N000013 HC RX MED GY IP 250 OP 250 PS 637: Performed by: STUDENT IN AN ORGANIZED HEALTH CARE EDUCATION/TRAINING PROGRAM

## 2023-08-25 PROCEDURE — 258N000003 HC RX IP 258 OP 636: Performed by: HOSPITALIST

## 2023-08-25 PROCEDURE — 250N000011 HC RX IP 250 OP 636: Performed by: HOSPITALIST

## 2023-08-25 PROCEDURE — 94640 AIRWAY INHALATION TREATMENT: CPT

## 2023-08-25 RX ADMIN — BUPROPION HYDROCHLORIDE 150 MG: 150 TABLET, FILM COATED, EXTENDED RELEASE ORAL at 08:30

## 2023-08-25 RX ADMIN — APIXABAN 5 MG: 5 TABLET, FILM COATED ORAL at 20:55

## 2023-08-25 RX ADMIN — SIMVASTATIN 20 MG: 20 TABLET, FILM COATED ORAL at 01:14

## 2023-08-25 RX ADMIN — IPRATROPIUM BROMIDE AND ALBUTEROL 1 PUFF: 20; 100 SPRAY, METERED RESPIRATORY (INHALATION) at 20:57

## 2023-08-25 RX ADMIN — SIMVASTATIN 20 MG: 20 TABLET, FILM COATED ORAL at 21:03

## 2023-08-25 RX ADMIN — PANTOPRAZOLE SODIUM 20 MG: 20 TABLET, DELAYED RELEASE ORAL at 06:58

## 2023-08-25 RX ADMIN — APIXABAN 5 MG: 5 TABLET, FILM COATED ORAL at 08:31

## 2023-08-25 RX ADMIN — METOPROLOL SUCCINATE 25 MG: 25 TABLET, EXTENDED RELEASE ORAL at 20:55

## 2023-08-25 RX ADMIN — OSELTAMIVIR PHOSPHATE 75 MG: 75 CAPSULE ORAL at 08:30

## 2023-08-25 RX ADMIN — Medication 5 ML: at 01:37

## 2023-08-25 RX ADMIN — IPRATROPIUM BROMIDE AND ALBUTEROL SULFATE 3 ML: .5; 3 SOLUTION RESPIRATORY (INHALATION) at 08:48

## 2023-08-25 RX ADMIN — ASPIRIN 81 MG: 81 TABLET, COATED ORAL at 08:30

## 2023-08-25 RX ADMIN — OSELTAMIVIR PHOSPHATE 75 MG: 75 CAPSULE ORAL at 20:55

## 2023-08-25 RX ADMIN — LEVOTHYROXINE SODIUM 175 MCG: 175 TABLET ORAL at 06:57

## 2023-08-25 RX ADMIN — VANCOMYCIN HYDROCHLORIDE 1750 MG: 10 INJECTION, POWDER, LYOPHILIZED, FOR SOLUTION INTRAVENOUS at 11:56

## 2023-08-25 RX ADMIN — GUAIFENESIN 600 MG: 600 TABLET, EXTENDED RELEASE ORAL at 08:30

## 2023-08-25 RX ADMIN — GUAIFENESIN 600 MG: 600 TABLET, EXTENDED RELEASE ORAL at 20:55

## 2023-08-25 RX ADMIN — Medication 5 ML: at 15:03

## 2023-08-25 ASSESSMENT — ACTIVITIES OF DAILY LIVING (ADL)
ADLS_ACUITY_SCORE: 26
ADLS_ACUITY_SCORE: 24
ADLS_ACUITY_SCORE: 31
ADLS_ACUITY_SCORE: 26
ADLS_ACUITY_SCORE: 25
ADLS_ACUITY_SCORE: 26
ADLS_ACUITY_SCORE: 28
ADLS_ACUITY_SCORE: 31
ADLS_ACUITY_SCORE: 24
ADLS_ACUITY_SCORE: 26

## 2023-08-25 NOTE — PLAN OF CARE
Goal Outcome Evaluation:       2811-3518  Code: Full  Dx: influza A positive, hypoxia  Mental Status/orientation: alert and oriented X4 sometimes forgetful  Vitals: VSS,RA  Diet: regular  /GI:  mixed continent,  dribbles. Had medium loose BM  Skin: scattered bruises  Activity level: up SBA, gbw. Ind with bed mobility.  Pain management: denies  LDAS: port-A-Cath, left chest  Discharge plan: discharge pending negative BC, today    Other info: home CPAP at night, received incoming call from wife, she  said she live about 3 hours away, if discharge will be after 2pm, expressed concerns it will be late to drive back home with pt, she will call back this morning to finalized discharge time.        See flow sheet for detail assessments:

## 2023-08-25 NOTE — PLAN OF CARE
Summary:SOB/hypoxia, Influenza A  DATE & TIME: 8/25/2023 7419-8085  Orientation: A&Ox4, forgetful.   BEHAVIOR & AGGRESSION TOOL COLOR: green  ABNL VS/O2: VSS on RA.  Telemetry: NA  MOBILITY: steady on feet, made independent.  PAIN MANAGMENT: denies   DIET:  Regular- tolerating   BOWEL/BLADDER: Continent of bowel- 1 BM today, incontinent of bladder at times, dribbles.  ABNL LAB: None new  DRAIN/DEVICES:  Left chest port A cath, hep locked  SKIN: scattered bruises. Dry/flaky skin  TESTS/PROCEDURES: n/a  D/C DAY/GOALS/PLACE: Discharge home tomorrow. Patient lives in [place] so family has a long drive to get here. They are leaving their house at 11 am  OTHER IMPORTANT INFO:  Lung sounds clear with fine crackle/coarse in bases, receiving Duonebs. Productive cough. THOMSON. On IV Vanco q24h, ID recommends to stop vanco if repeat BC from 8/23 remains negative (possibly this evening?); Plan for port removal outpatient as anticoagulation needs to be held for 2-3 days. PT/OT saw the pt, recommending home with assist and HCPT. CPAP at night.

## 2023-08-25 NOTE — PLAN OF CARE
Goal Outcome Evaluation:      Plan of Care Reviewed With: patient    Summary: Admitted to ER 8/21/2023 for acute onset of fever and cough.  Recently traveled to [place]. Influenza A positive.   DATE & TIME: 08/24/23 4934-9470  Cognitive Concerns/ Orientation: A&Ox4, forgetful at times, needs frequent reminder to call before getting OOB.   BEHAVIOR & AGGRESSION TOOL COLOR: green  ABNL VS/O2: VSS, O2 95% RA. Uses home cpap at night and intermittently during days for naps. Intermittent coughs with sore throat, received prn robitussin x1 and Lozenge x1.   Telemetry: NA  MOBILITY: SBA w/GB, gait can be unsteady at times.  PAIN MANAGMENT: denies pain  DIET:  Regular  BOWEL/BLADDER: Continent of bowel, incontinent of bladder at times, dribbles. Also uses urinal while in bed.   ABNL LAB: Blood cultures from 8/21 is positive for staph epidermidis and gram positive cocci in clusters, repeat cultures from 8/23 remains NGTD, Plt 142,   DRAIN/DEVICES:  Left chest port A cath, hep locked  SKIN: scattered bruises. Dry/flaky skin  TESTS/PROCEDURES: n/a  D/C DAY/GOALS/PLACE: Possible discharge tomorrow if repeat BC from 8/23 remains negative.   OTHER IMPORTANT INFO:  Lung sounds clear/fine crackle/coarse, receiving Duonebs. Productive cough. THOMSON. On IV Vanco q24h, ID recommends to stop vanco tomorrow if repeat BC from 8/23 remains negative. PT/OT saw the pt, recommending home with assist and HCPT.

## 2023-08-25 NOTE — PROGRESS NOTES
[facility]    Medicine Progress Note - Hospitalist Service    Date of Admission:  8/21/2023    Assessment & Plan   72 y/o male presented with upper respiratory tract complaints and fevers after recent travel to [place]. He lives in [place], [place] but was too ill to return home and came to [facility] ED after landing at the airport.  Found to be influenza + on presentation and started on tamiflu.  Also had diarrheal illness (has resolved) and stool cultures positive for Enteropathogenic E coli.   He also had 2 positive blood cultures of unclear significance.    Acute Influenza A infection  Acute hypoxic respiratory failure:  -  Covid negative, Flu A positive.  Finishing tamiflu course.  -  Congested coughing gradually improved.  Doing well on room air now.  -  Continue mucinex bid     2/2 Positive Blood cultures, staph epi - 8/21/23  Old port from prior lymphoma care still in place:  Started on IV vanco.  Colleague prior discussed case with Infectious Disease who was consulted.   While staph epi may be contaminant, concerning it may be real with 2 cultures positive and old port.  Patient has been recommended to have port removed but declined.  He strongly wants to keep it as he doesn't like blood draws and does not want it removed.  He understands it may be infected.  Repeat cultures thus far negative.  If they remain negative until this evening will stop IV vancomycin.  Plan will then be for d/c home tomorrow without PCP follow-up.  I would recommend BC's repeated outpatient next week off antibiotics.     ADDENDUM:  Notified patient/family after further conversation is now considering having the port removed outpatient.  They would prefer this be  to home in [place].  I contacted IR to verify requirements with anticoag and standard at least 2 day Eliquis hold needed.   Patients wife plans to contact his PCP office and work with them next week to see patient and further discuss the  "possible port removal.  Even if it isn't clearly infected, given its age and lack of need would recommend removal.    Enteropathogenic E coli  Diarrheal illness, resolved:  He reports that his diarrhea has resolved (confirmed with RN).  Family also had some loose stools on his trip by report.  No treatment recommended, loose stools appear resolved.     Afib  CAD s/p PCI:  - Continue home, metoprolol, eliquis, atorvastatin and asa     Lymphoma in remission  Last treatment 7 years ago. He still has a port in place for preference  - on surveillance     Incidental CVA  Noted on MRI imaging obtained as outpatient work up for dizziness 05/2023  - outpatient work up with PCP ongoing  - continue eliquis, asa and statin     Depression  Suspected mild cognitive decline  - Resume home wellbutrin. Patient tapering off prozac, will just hold for now  - wife notes ongoing outpatient work up for some memory problems and patient with some forgetfulness     Hypothyroidism  - levothyroxine     KENYA  - Home CPAP     Cognitive issues, deconditioning:  This is noted in his chart prior.  Appears could benefit from some outpatient therapy at discharge.  Plan currently is d/c home with his spouse.       Medical Decision Making       40 MINUTES SPENT BY ME on the date of service doing chart review, history, exam, documentation & further activities per the note.      Labs/Imaging Reviewed:  See Information above and Data section below    PPE Used:  Mask, gown/gloves       Diet: Combination Diet Regular Diet Adult    DVT Prophylaxis: Pneumatic Compression Devices  Springer Catheter: Not present  Lines: PRESENT      Port a Cath 08/21/23 Left Chest wall-Site Assessment: WDL      Cardiac Monitoring: None  Code Status: Full Code      Clinically Significant Risk Factors                         # Obesity: Estimated body mass index is 31.15 kg/m  as calculated from the following:    Height as of this encounter: 1.676 m (5' 6\").    Weight as of this " encounter: 87.5 kg (193 lb)., PRESENT ON ADMISSION            Disposition Plan      Expected Discharge Date: 08/26/2023        Discharge Comments: Expect discharge 8/26 AM if follow-up Blood cultures remain negative.          Sreekanth Colin DO  Hospitalist Service  [facility]  Securely message with [employer] (more info)  Text page via [facility] Paging/Directory   ______________________________________________________________________    Interval History   Mr. Guerrero is feeling much better than a couple days ago.  No new complaints.    Physical Exam   Vital Signs: Temp: 98.8  F (37.1  C) Temp src: Oral BP: 134/75 Pulse: 66   Resp: 16 SpO2: 94 % O2 Device: None (Room air)    Weight: 193 lbs 0 oz    GEN:  Alert, oriented x 3, appears comfortable, no overt distress.  Appears less ill than when I saw him a couple days prior.  Up in chair when I saw him earlier today.  HEENT:  Normocephalic/atraumatic, no scleral icterus, no nasal discharge, mouth moist.  CV:  Regular rate and rhythm, no loud murmur/rub.  LUNGS:  Clear to auscultation bilaterally without rales/rhonchi/wheezing/retractions.  Symmetric chest rise on inhalation noted.  ABD:  Active bowel sounds, soft, non-tender/non-distended.  No guarding/rigidity.  EXT:  No edema.  No cyanosis.  No acute joint synovitis noted.  SKIN:  Dry to touch, no exanthems noted in the visualized areas.    Medications    - MEDICATION INSTRUCTIONS -        apixaban ANTICOAGULANT  5 mg Oral BID    aspirin  81 mg Oral Daily    buPROPion  150 mg Oral QAM    guaiFENesin  600 mg Oral BID    heparin  5-10 mL Intracatheter Q28 Days    heparin lock flush  5-10 mL Intracatheter Q24H    ipratropium-albuterol  1 puff Inhalation 4x daily    levothyroxine  175 mcg Oral QAM AC    metoprolol succinate ER  25 mg Oral QPM    oseltamivir  75 mg Oral BID    pantoprazole  20 mg Oral QAM AC    simvastatin  20 mg Oral At Bedtime    sodium chloride (PF)  10-20 mL Intracatheter Q28 Days     sodium chloride (PF)  3 mL Intracatheter Q8H    vancomycin  1,750 mg Intravenous Q24H       Data     Labs and Imaging results below reviewed today.        7-Day Micro Results       Collected Updated Procedure Result Status      08/23/2023 1217 08/24/2023 1532 Blood Culture Hand, Left [[id number]]   Blood from Hand, Left    Preliminary result Component Value   Culture No growth after 1 day  [P]                08/23/2023 0849 08/25/2023 1316 Blood Culture Hand, Left [[id number]]   Blood from Hand, Left    Preliminary result Component Value   Culture No growth after 2 days  [P]                08/22/2023 1835 08/24/2023 2101 Blood Culture Hand, Left [[id number]]   Blood from Hand, Left    Preliminary result Component Value   Culture No growth after 2 days  [P]                08/22/2023 1256 08/22/2023 1810 C. difficile Toxin B PCR with reflex to C. difficile Antigen and Toxins A/B EIA [[id number]]    Stool from Per Rectum    Final result Component Value   C Difficile Toxin B by PCR Negative   A negative result does not exclude actual disease due to C. difficile and may be due to improper collection, handling and storage of the specimen or the number of organisms in the specimen is below the detection limit of the assay.            08/22/2023 1256 08/22/2023 1902 Enteric Bacteria and Virus Panel PCR [[id number]]    (Abnormal)   Stool from Per Rectum    Final result Component Value   Campylobacter species Negative   Salmonella species Negative   Vibrio species Negative   Vibrio cholerae Negative   Yersinia enterocolitica Negative   Enteropathogenic E. coli (EPEC) Positive   Shiga-like toxin-producing E. coli (STEC) Negative   Shigella/Enteroinvasive E. coli (EIEC) Negative   Cryptosporidium species Negative   Giardia lamblia Negative   Norovirus Gl/Gll Negative   Rotavirus A Negative   Plesiomonas shigelloides Negative   Enteroaggregative E. coli (EAEC) Negative   Enterotoxigenic E. coli (ETEC) Negative   E.  "coli O157 NA   Cyclospora cayetanensis Negative   Entamoeba histolytica Negative   Adenovirus F40/41 Negative   Astrovirus Negative   Sapovirus Negative            08/21/2023 2301 08/24/2023 0819 Blood Culture Peripheral Blood [[id number]]   (Abnormal)   Peripheral Blood    Final result Component Value   Culture Positive on the 1st day of incubation    Staphylococcus epidermidis    1 of 2 bottlesSusceptibilities done on previous cultures               08/21/2023 1600 08/24/2023 0818 Blood Culture Peripheral Blood [[id number]]    (Abnormal)   Peripheral Blood    Final result Component Value   Culture Positive on the 1st day of incubation    Staphylococcus epidermidis    1 of 2 bottles        Susceptibility        Staphylococcus epidermidis      CHARLES      Ciprofloxacin <=0.5 ug/mL Susceptible      Clindamycin 0.25 ug/mL Susceptible      Daptomycin <=0.12 ug/mL Susceptible      Doxycycline <=0.5 ug/mL Susceptible      Erythromycin <=0.25 ug/mL Susceptible      Gentamicin <=0.5 ug/mL Susceptible      Levofloxacin <=0.12 ug/mL Susceptible      Oxacillin <=0.25 ug/mL Susceptible  [1]       Tetracycline <=1 ug/mL Susceptible      Vancomycin 1 ug/mL Susceptible                   [1]  Oxacillin susceptible isolates are susceptible to cephalosporins (example: cefazolin and cephalexin) and beta lactam combination agents. Oxacillin resistant isolates are resistant to these agents.              Susceptibility Comments       Staphylococcus epidermidis    Antibiotics listed as \"No Interpretation\" have no regulatory guidelines for susceptibility/resistance available.               08/21/2023 1600 08/22/2023 1932 [employer] GP Panel [[id number]]    (Abnormal)   Peripheral Blood    Final result Component Value   Staphylococcus aureus Not Detected   Staphylococcus epidermidis Detected   Positive for Staphylococcus epidermidis and negative for the mecA gene (not resistant to methicillin) by [employer] multiplex nucleic acid test. " Final identification and antimicrobial susceptibility testing will be verified by standard methods.   Staphylococcus lugdunensis Not Detected   Enterococcus faecalis Not Detected   Enterococcus faecium Not Detected   Streptococcus species Not Detected   Streptococcus agalactiae Not Detected   Streptococcus anginosus group Not Detected   Streptococcus pneumoniae Not Detected   Streptococcus pyogenes Not Detected   Listeria species Not Detected            08/21/2023 1557 08/21/2023 1658 Symptomatic Influenza A/B, RSV, & SARS-CoV2 PCR (COVID-19) Nasopharyngeal [[id number]]    (Abnormal)   Swab from Nasopharyngeal    Final result Component Value   Influenza A PCR Positive   Influenza B PCR Negative   RSV PCR Negative   SARS CoV2 PCR Negative   NEGATIVE: SARS-CoV-2 (COVID-19) RNA not detected, presumed negative.                    No results found for this or any previous visit (from the past 24 hour(s)).

## 2023-08-26 VITALS
HEIGHT: 66 IN | DIASTOLIC BLOOD PRESSURE: 81 MMHG | SYSTOLIC BLOOD PRESSURE: 105 MMHG | TEMPERATURE: 97.8 F | WEIGHT: 193 LBS | HEART RATE: 52 BPM | RESPIRATION RATE: 22 BRPM | OXYGEN SATURATION: 93 % | BODY MASS INDEX: 31.02 KG/M2

## 2023-08-26 LAB — VANCOMYCIN SERPL-MCNC: 17.8 UG/ML

## 2023-08-26 PROCEDURE — 250N000013 HC RX MED GY IP 250 OP 250 PS 637: Performed by: STUDENT IN AN ORGANIZED HEALTH CARE EDUCATION/TRAINING PROGRAM

## 2023-08-26 PROCEDURE — 250N000011 HC RX IP 250 OP 636: Performed by: STUDENT IN AN ORGANIZED HEALTH CARE EDUCATION/TRAINING PROGRAM

## 2023-08-26 PROCEDURE — 80202 ASSAY OF VANCOMYCIN: CPT | Performed by: STUDENT IN AN ORGANIZED HEALTH CARE EDUCATION/TRAINING PROGRAM

## 2023-08-26 PROCEDURE — 99239 HOSP IP/OBS DSCHRG MGMT >30: CPT | Performed by: INTERNAL MEDICINE

## 2023-08-26 RX ORDER — ALBUTEROL SULFATE 90 UG/1
2 AEROSOL, METERED RESPIRATORY (INHALATION) EVERY 6 HOURS PRN
Qty: 18 G | Refills: 0 | Status: SHIPPED | OUTPATIENT
Start: 2023-08-26

## 2023-08-26 RX ORDER — GUAIFENESIN 200 MG/10ML
200 LIQUID ORAL EVERY 4 HOURS PRN
Qty: 118 ML | Refills: 0 | Status: SHIPPED | OUTPATIENT
Start: 2023-08-26

## 2023-08-26 RX ORDER — INHALER, ASSIST DEVICES
SPACER (EA) MISCELLANEOUS
Qty: 1 EACH | Refills: 0 | Status: SHIPPED | OUTPATIENT
Start: 2023-08-26

## 2023-08-26 RX ADMIN — Medication 5 ML: at 02:57

## 2023-08-26 RX ADMIN — LEVOTHYROXINE SODIUM 175 MCG: 175 TABLET ORAL at 07:44

## 2023-08-26 RX ADMIN — PANTOPRAZOLE SODIUM 20 MG: 20 TABLET, DELAYED RELEASE ORAL at 07:44

## 2023-08-26 RX ADMIN — ASPIRIN 81 MG: 81 TABLET, COATED ORAL at 07:44

## 2023-08-26 RX ADMIN — OSELTAMIVIR PHOSPHATE 75 MG: 75 CAPSULE ORAL at 07:44

## 2023-08-26 RX ADMIN — BUPROPION HYDROCHLORIDE 150 MG: 150 TABLET, FILM COATED, EXTENDED RELEASE ORAL at 07:44

## 2023-08-26 RX ADMIN — Medication 5 ML: at 06:17

## 2023-08-26 RX ADMIN — IPRATROPIUM BROMIDE AND ALBUTEROL 1 PUFF: 20; 100 SPRAY, METERED RESPIRATORY (INHALATION) at 07:44

## 2023-08-26 RX ADMIN — Medication 5 ML: at 08:38

## 2023-08-26 RX ADMIN — APIXABAN 5 MG: 5 TABLET, FILM COATED ORAL at 07:44

## 2023-08-26 ASSESSMENT — ACTIVITIES OF DAILY LIVING (ADL)
ADLS_ACUITY_SCORE: 26

## 2023-08-26 NOTE — DISCHARGE SUMMARY
[facility]  Discharge Summary  Hospitalist    Date of Admission:  8/21/2023  Date of Discharge:  8/26/2023 11:29 AM  Provider:  Sreekanth Colin DO, [facility]    Discharge Diagnoses   Influenza A infection with initial acute hypoxic respiratory failure  Positive blood culture with Staph Epi  Recent loose stools with Enteropathogenic E. Coli found on stool studies    Other medical issues:  History of lymphoma, old port in place  KENYA  Prior CVA  Depression with possible mild cognitive decline  Hypothyroidism  CAD, prior PCI  Parox afib      History of Present Illness   Los Guerrero is an 73 year old male who presented with respiratory complaints, fevers, and generalized fatigue/weakness while traveling back from a vacation to [place].  Please see the admission history and physical for full details.    Hospital Course   Los Guerrero was admitted on 8/21/2023.  The following problems were addressed during his hospitalization:    Acute Influenza A infection  Acute hypoxic respiratory failure:  -  Covid negative, Flu A positive.  Likely contracted in [place] on recent trip.  He was treated with Tamiflu and completed course just prior to discharge.  Initially somewhat hypoxic, that cleared by discharge with reasonable room air saturations.  He had significant cough much of stay but by discharge cough and breathing were dramatically better.  I suspect he also had a mild reactive airway flare triggered by the influenza.  He also had initial fevers that cleared by discharge.  He was moving around much better by discharge and his wife felt comfortable helping him at home.    2/2 Positive Blood cultures, staph epi - 8/21/23 with repeat cultures negative  Old port from prior lymphoma care still in place:  During stay patients initial blood cultures turned positive 2/2 with Staph Epi.  Repeat cultures though are all negative.  Patient has an old port that from his prior lymphoma care that he has been advised to  remove prior but has wanted to keep as he strongly doesn't like blood draws.  It is unclear if the initial cultures were a skin contaminant or true infection.   Port site appeared in good repair/without overt infection. There is concern BC could have been a true infection and he was initially placed on IV vancomycin and ID consulted.  After discussions and given negative repeat cultures antibiotics were stopped.  Patient was again advised to have the line removed.  He is now considering it more seriously but wants to talk to his local providers.  His main issues including fever initially felt influenza related.  However, given the line concern I explained to he and his wife that any fevers in the next few days should lead to urgent local evaluation in [place] as they could represent line infection as flu is now much better.  In addition, I recommended he have repeat blood cultures locally in [place] should he not choose to have his line removed soon to make sure no ongoing subacute bacteremia.   Should he have his line removed, it would have to be scheduled in conjunction with holding his apixaban at least a couple days.    Enteropathogenic E coli  Diarrheal illness, resolved:  He reports that his diarrhea has resolved.  Family also had some loose stools on his trip by report.  No treatment recommended, loose stools appear resolved.  Suspect he may have contracted a recent infection in his travels.    Other issues:  Patient had no major acute cardiac issues noted.  He is remaining on his PTA meds including apixaban.  He should continue to follow-up with his local physicians for his cardiac and CVA follow-up care.   Please see the medical record for other details of his stay.    Significant Results and Procedures   See below    Pending Results     Unresulted Labs Ordered in the Past 30 Days of this Admission       Date and Time Order Name Status Description    8/23/2023 11:03 AM Blood Culture Hand, Left Preliminary      "8/23/2023  7:18 AM Blood Culture Hand, Left Preliminary     8/22/2023  5:00 PM Blood Culture Hand, Left Preliminary             Code Status   Full Code       Primary Care Physician   Physician No Ref-Primary    Blood pressure 105/81, pulse 52, temperature 97.8  F (36.6  C), temperature source Oral, resp. rate 22, height 1.676 m (5' 6\"), weight 87.5 kg (193 lb), SpO2 93 %.  Day of discharge alert and oriented, appeared comfortable and energetic.      Discharge Disposition   Discharged to home    Consultations This Hospital Stay   PHARMACY TO DOSE VANCO  PHARMACY TO DOSE VANC  INFECTIOUS DISEASES IP CONSULT  PHYSICAL THERAPY ADULT IP CONSULT  OCCUPATIONAL THERAPY ADULT IP CONSULT  CARE MANAGEMENT / SOCIAL WORK IP CONSULT  INFECTIOUS DISEASES IP CONSULT    Time Spent on this Encounter   ISreekanth DO, personally saw the patient today and spent greater than 30 minutes discharging this patient.    Discharge Orders      Physical Therapy Referral      Reason for your hospital stay    Influenza     Follow-up and recommended labs and tests     Follow-up with your primary provider next week.  Recommend you get 2 blood cultures done on day of follow-up and discuss getting your port/line removed.     Activity    Your activity upon discharge: activity as tolerated with family assist     When to contact your care team    Call if questions.  Notify provider/seek eval if worsening shortness of breath, new fevers/chills, other new medical concerns.     Full Code     Diet    Follow this diet upon discharge: Regular diet     Discharge Medications   Current Discharge Medication List        START taking these medications    Details   albuterol (PROAIR HFA/PROVENTIL HFA/VENTOLIN HFA) 108 (90 Base) MCG/ACT inhaler Inhale 2 puffs into the lungs every 6 hours as needed for shortness of breath, wheezing or cough  Qty: 18 g, Refills: 0    Comments: Pharmacy may dispense brand covered by insurance (Proair, or proventil or ventolin or " "generic albuterol inhaler) Dispense with Spacer please  Associated Diagnoses: Influenzal pneumonia      guaiFENesin (ROBITUSSIN) 20 mg/mL liquid Take 10 mLs (200 mg) by mouth every 4 hours as needed for cough  Qty: 118 mL, Refills: 0    Associated Diagnoses: Influenzal pneumonia      spacer (OPTICHAMBER ROSIO) holding chamber Use with albuterol inhaler  Qty: 1 each, Refills: 0    Associated Diagnoses: Influenzal pneumonia           CONTINUE these medications which have NOT CHANGED    Details   apixaban ANTICOAGULANT (ELIQUIS) 5 MG tablet Take 5 mg by mouth 2 times daily      aspirin 81 MG EC tablet Take 81 mg by mouth Approximately every other day per spouse      buPROPion (WELLBUTRIN XL) 150 MG 24 hr tablet Take 150 mg by mouth every morning      cyanocobalamin (VITAMIN B-12) 1000 MCG tablet Take 1,000 mcg by mouth daily      FLUoxetine (PROZAC) 40 MG capsule Take 40 mg by mouth Approximately every other day, \"titrating off\"      levothyroxine (SYNTHROID/LEVOTHROID) 175 MCG tablet Take 175 mcg by mouth daily      loperamide (IMODIUM A-D) 2 MG tablet Take 2 mg by mouth 2 times daily as needed for diarrhea      metoprolol succinate ER (TOPROL XL) 25 MG 24 hr tablet Take 25 mg by mouth every evening      Omeprazole Magnesium (PRILOSEC PO) Take by mouth daily Dose unknown, OTC      simvastatin (ZOCOR) 20 MG tablet Take 20 mg by mouth At Bedtime      VITAMIN D PO Take by mouth daily Dose unknown, possibly 5000 units             Allergies   Allergies   Allergen Reactions    Penicillins Shortness Of Breath     Data   Recent Labs   Lab 08/24/23  0636 08/22/23  0612 08/21/23  1554   WBC 5.2 6.1 9.0   HGB 13.3 13.4 14.3   HCT 41.2 41.4 43.1   MCV 95 94 93   * 113* 141*     7-Day Micro Results       Collected Updated Procedure Result Status      08/23/2023 1217 08/26/2023 1532 Blood Culture Hand, Left [[id number]]   Blood from Hand, Left    Preliminary result Component Value   Culture No growth after 3 days  [P]  "               08/23/2023 0849 08/26/2023 1316 Blood Culture Hand, Left [[id number]]   Blood from Hand, Left    Preliminary result Component Value   Culture No growth after 3 days  [P]                08/22/2023 1835 08/26/2023 2101 Blood Culture Hand, Left [[id number]]   Blood from Hand, Left    Preliminary result Component Value   Culture No growth after 4 days  [P]                08/22/2023 1256 08/22/2023 1810 C. difficile Toxin B PCR with reflex to C. difficile Antigen and Toxins A/B EIA [[id number]]    Stool from Per Rectum    Final result Component Value   C Difficile Toxin B by PCR Negative   A negative result does not exclude actual disease due to C. difficile and may be due to improper collection, handling and storage of the specimen or the number of organisms in the specimen is below the detection limit of the assay.            08/22/2023 1256 08/22/2023 1902 Enteric Bacteria and Virus Panel PCR [[id number]]    (Abnormal)   Stool from Per Rectum    Final result Component Value   Campylobacter species Negative   Salmonella species Negative   Vibrio species Negative   Vibrio cholerae Negative   Yersinia enterocolitica Negative   Enteropathogenic E. coli (EPEC) Positive   Shiga-like toxin-producing E. coli (STEC) Negative   Shigella/Enteroinvasive E. coli (EIEC) Negative   Cryptosporidium species Negative   Giardia lamblia Negative   Norovirus Gl/Gll Negative   Rotavirus A Negative   Plesiomonas shigelloides Negative   Enteroaggregative E. coli (EAEC) Negative   Enterotoxigenic E. coli (ETEC) Negative   E. coli O157 NA   Cyclospora cayetanensis Negative   Entamoeba histolytica Negative   Adenovirus F40/41 Negative   Astrovirus Negative   Sapovirus Negative            08/21/2023 2301 08/24/2023 0819 Blood Culture Peripheral Blood [[id number]]   (Abnormal)   Peripheral Blood    Final result Component Value   Culture Positive on the 1st day of incubation    Staphylococcus epidermidis    1 of 2  "bottlesSusceptibilities done on previous cultures               08/21/2023 1600 08/24/2023 0818 Blood Culture Peripheral Blood [[id number]]    (Abnormal)   Peripheral Blood    Final result Component Value   Culture Positive on the 1st day of incubation    Staphylococcus epidermidis    1 of 2 bottles        Susceptibility        Staphylococcus epidermidis      CHARLES      Ciprofloxacin <=0.5 ug/mL Susceptible      Clindamycin 0.25 ug/mL Susceptible      Daptomycin <=0.12 ug/mL Susceptible      Doxycycline <=0.5 ug/mL Susceptible      Erythromycin <=0.25 ug/mL Susceptible      Gentamicin <=0.5 ug/mL Susceptible      Levofloxacin <=0.12 ug/mL Susceptible      Oxacillin <=0.25 ug/mL Susceptible  [1]       Tetracycline <=1 ug/mL Susceptible      Vancomycin 1 ug/mL Susceptible                   [1]  Oxacillin susceptible isolates are susceptible to cephalosporins (example: cefazolin and cephalexin) and beta lactam combination agents. Oxacillin resistant isolates are resistant to these agents.              Susceptibility Comments       Staphylococcus epidermidis    Antibiotics listed as \"No Interpretation\" have no regulatory guidelines for susceptibility/resistance available.               08/21/2023 1600 08/22/2023 1932 [employer] GP Panel [[id number]]    (Abnormal)   Peripheral Blood    Final result Component Value   Staphylococcus aureus Not Detected   Staphylococcus epidermidis Detected   Positive for Staphylococcus epidermidis and negative for the mecA gene (not resistant to methicillin) by "[employer] multiplex nucleic acid test. Final identification and antimicrobial susceptibility testing will be verified by standard methods.   Staphylococcus lugdunensis Not Detected   Enterococcus faecalis Not Detected   Enterococcus faecium Not Detected   Streptococcus species Not Detected   Streptococcus agalactiae Not Detected   Streptococcus anginosus group Not Detected   Streptococcus pneumoniae Not Detected   Streptococcus " pyogenes Not Detected   Listeria species Not Detected            08/21/2023 1557 08/21/2023 1658 Symptomatic Influenza A/B, RSV, & SARS-CoV2 PCR (COVID-19) Nasopharyngeal [[id number]]    (Abnormal)   Swab from Nasopharyngeal    Final result Component Value   Influenza A PCR Positive   Influenza B PCR Negative   RSV PCR Negative   SARS CoV2 PCR Negative   NEGATIVE: SARS-CoV-2 (COVID-19) RNA not detected, presumed negative.                  Recent Labs   Lab 08/24/23  0636 08/23/23  0639 08/22/23  0612 08/21/23  1554     --  137 135*   POTASSIUM 4.1 4.0 4.4 4.0   CHLORIDE 101  --  103 99   CO2 25  --  22 22   ANIONGAP 12  --  12 14   GLC 98  --  113* 118*   BUN 12.8  --  14.8 14.9   CR 0.91  --  0.98 1.05   GFRESTIMATED 88  --  81 74   KB 8.6*  --  8.4* 8.7*   PROTTOTAL  --   --   --  6.7   ALBUMIN  --   --   --  4.3   BILITOTAL  --   --   --  0.5   ALKPHOS  --   --   --  46   AST  --   --   --  41   ALT  --   --   --  22     Results for orders placed or performed during the hospital encounter of 08/21/23   XR Chest 2 Views    Narrative    EXAM: XR CHEST 2 VIEWS  LOCATION: [facility]  DATE: 8/21/2023    INDICATION: Fever, cough, pneumonia  COMPARISON: None.      Impression    IMPRESSION: Left-sided portacatheter with tip in the SVC. Normal heart size. Coronary arterial stents. No pulmonary infiltrates. Mild hyperinflation.

## 2023-08-26 NOTE — PROGRESS NOTES
Feels much better, no new complaints.  Repeat cultures all negative.  It is unclear if his cultures were true infection or not, possibly skin contaminant but concerning as was 2/2 cultures.  If they should turn positive after discharge he would need to be seen in [place] and abx/line removal expedited.   Tamiflu course completed this AM.  Patient wants to d/c home to [place].  Plan d/c today with outpatient follow-up recommended next week.  He was encouraged to get his port removed as it is quite old and he doesn't need it for any ongoing acute medical issue.  I emphasized that if his port is not removed he needs repeat BC's off abx next week and also if he has any fevers/chills he should be reassessed in [place] before then as fevers at this point would not likely be from flu and would be more concerning for line infection.  Full d/c summary to follow.

## 2023-08-26 NOTE — PLAN OF CARE
Discharge    Patient discharged to home via wife with PT orders  Care plan note: patient was A&O x4. VSS on RA. Up independently, steady on feet. Port de-assessed. Still some THOMSON, but improved. Mild nonproductive cough. LS diminished. No pain. Discharge AVS reviewed with patient and wife. Educated on medication changes, inhaler use, when to seek medical advice and follow up. They verbalized understanding and had no further questions. Medications sent with patient. They have a follow up with PCP next Thursday per wife, for port removal. All patient belongings accounted for and sent with patient.     Listed belongings gathered and given to patient (including from security/pharmacy). Yes  Care Plan and Patient education resolved: Yes  Prescriptions if needed, hard copies sent with patient  Yes  Medication Bin checked and emptied on discharge Yes  SW/care coordinator/charge RN aware of discharge: No

## 2023-08-26 NOTE — PLAN OF CARE
Physical Therapy Discharge Summary    Reason for therapy discharge:    Discharged to home with recommendations for outpatient PT.    Progress towards therapy goal(s). See goals on Care Plan in [facility] electronic health record for goal details.  Goals not met.  Barriers to achieving goals:   discharge from facility.    Therapy recommendation(s):    Continued therapy is recommended.  Rationale/Recommendations:  For further strengthening and endurance training.

## 2023-08-26 NOTE — PLAN OF CARE
Goal Outcome Evaluation:         Summary:SOB/hypoxia, Influenza A  DATE & TIME: 8/25/2023-8/26/23 [id number]  Orientation: A&Ox4, forgetful at times  BEHAVIOR & AGGRESSION TOOL COLOR: green  ABNL VS/O2: VSS on RA.  Telemetry: NA  MOBILITY: Independent   PAIN MANAGEMENT: denies   DIET:  Regular- tolerating   BOWEL/BLADDER: Continent of bowel, incontinent of bladder at times.  ABNL LAB: None new  DRAIN/DEVICES:  Left chest port A cath, hep locked  SKIN: scattered bruises. Dry/flaky skin  TESTS/PROCEDURES: n/a  D/C DAY/GOALS/PLACE: Discharge home 8/26. Patient lives in [place] so family has a long drive to get here. They are leaving their house at around 1030/-11 am per wife   OTHER IMPORTANT INFO:  Lung sounds clear with fine crackle/coarse in bases, receiving Duonebs. Productive cough. THOMSON. On IV Vanco q24h, ID recommends to stop vanco if repeat BC from 8/23 remains negative; Plan for port removal outpatient as anticoagulation needs to be held for 2-3 days. PT/OT saw the pt, recommending home with assist and HCPT. CPAP at night.

## 2023-08-27 LAB — BACTERIA BLD CULT: NO GROWTH

## 2023-08-28 LAB
BACTERIA BLD CULT: NO GROWTH
BACTERIA BLD CULT: NO GROWTH

## 2023-08-29 ENCOUNTER — PATIENT OUTREACH (OUTPATIENT)
Dept: CARE COORDINATION | Facility: CLINIC | Age: 73
End: 2023-08-29
Payer: MEDICARE

## 2023-08-29 NOTE — PROGRESS NOTES
Clinic Care Coordination Contact  [facility]/Voicemail       Clinical Data: Care Coordinator Outreach  Outreach attempted x 2.  Left message on patient's voicemail with call back information and requested return call.    Plan: Care Coordinator will do no further outreaches at this time.    SHAKIR Blank  [facility]  [facility]     *Connected Care Resource Team does NOT follow patient ongoing. Referrals are identified based on internal discharge reports and the outreach is to ensure patient has an understanding of their discharge instructions.